# Patient Record
Sex: FEMALE | Race: WHITE | NOT HISPANIC OR LATINO | Employment: UNEMPLOYED | URBAN - METROPOLITAN AREA
[De-identification: names, ages, dates, MRNs, and addresses within clinical notes are randomized per-mention and may not be internally consistent; named-entity substitution may affect disease eponyms.]

---

## 2023-02-08 ENCOUNTER — HOSPITAL ENCOUNTER (EMERGENCY)
Facility: HOSPITAL | Age: 33
Discharge: HOME | End: 2023-02-08
Attending: EMERGENCY MEDICINE

## 2023-02-08 VITALS
RESPIRATION RATE: 18 BRPM | WEIGHT: 220 LBS | HEART RATE: 87 BPM | HEIGHT: 64 IN | DIASTOLIC BLOOD PRESSURE: 89 MMHG | TEMPERATURE: 99.6 F | OXYGEN SATURATION: 96 % | BODY MASS INDEX: 37.56 KG/M2 | SYSTOLIC BLOOD PRESSURE: 159 MMHG

## 2023-02-08 DIAGNOSIS — U07.1 COVID-19: ICD-10-CM

## 2023-02-08 DIAGNOSIS — N39.0 UTI (URINARY TRACT INFECTION): ICD-10-CM

## 2023-02-08 DIAGNOSIS — R11.2 NAUSEA AND VOMITING: Primary | ICD-10-CM

## 2023-02-08 LAB
ALBUMIN SERPL BCP-MCNC: 4 G/DL (ref 3.5–5)
ALP SERPL-CCNC: 73 U/L (ref 46–116)
ALT SERPL W P-5'-P-CCNC: 38 U/L (ref 12–78)
ANION GAP SERPL CALCULATED.3IONS-SCNC: 8 MMOL/L (ref 4–13)
AST SERPL W P-5'-P-CCNC: 24 U/L (ref 5–45)
BACTERIA UR QL AUTO: ABNORMAL /HPF
BASOPHILS # BLD AUTO: 0.04 THOUSANDS/ÂΜL (ref 0–0.1)
BASOPHILS NFR BLD AUTO: 0 % (ref 0–1)
BILIRUB SERPL-MCNC: 0.4 MG/DL (ref 0.2–1)
BILIRUB UR QL STRIP: NEGATIVE
BUN SERPL-MCNC: 10 MG/DL (ref 5–25)
CALCIUM SERPL-MCNC: 9.6 MG/DL (ref 8.3–10.1)
CHLORIDE SERPL-SCNC: 99 MMOL/L (ref 96–108)
CLARITY UR: CLEAR
CO2 SERPL-SCNC: 28 MMOL/L (ref 21–32)
COLOR UR: YELLOW
CREAT SERPL-MCNC: 0.89 MG/DL (ref 0.6–1.3)
EOSINOPHIL # BLD AUTO: 0.02 THOUSAND/ÂΜL (ref 0–0.61)
EOSINOPHIL NFR BLD AUTO: 0 % (ref 0–6)
ERYTHROCYTE [DISTWIDTH] IN BLOOD BY AUTOMATED COUNT: 12.2 % (ref 11.6–15.1)
EXT PREGNANCY TEST URINE: NEGATIVE
EXT. CONTROL: NORMAL
GFR SERPL CREATININE-BSD FRML MDRD: 85 ML/MIN/1.73SQ M
GLUCOSE SERPL-MCNC: 148 MG/DL (ref 65–140)
GLUCOSE UR STRIP-MCNC: NEGATIVE MG/DL
HCT VFR BLD AUTO: 42.3 % (ref 34.8–46.1)
HGB BLD-MCNC: 13.8 G/DL (ref 11.5–15.4)
HGB UR QL STRIP.AUTO: ABNORMAL
IMM GRANULOCYTES # BLD AUTO: 0.03 THOUSAND/UL (ref 0–0.2)
IMM GRANULOCYTES NFR BLD AUTO: 0 % (ref 0–2)
KETONES UR STRIP-MCNC: NEGATIVE MG/DL
LEUKOCYTE ESTERASE UR QL STRIP: ABNORMAL
LIPASE SERPL-CCNC: 129 U/L (ref 73–393)
LYMPHOCYTES # BLD AUTO: 1.39 THOUSANDS/ÂΜL (ref 0.6–4.47)
LYMPHOCYTES NFR BLD AUTO: 14 % (ref 14–44)
MCH RBC QN AUTO: 27.8 PG (ref 26.8–34.3)
MCHC RBC AUTO-ENTMCNC: 32.6 G/DL (ref 31.4–37.4)
MCV RBC AUTO: 85 FL (ref 82–98)
MONOCYTES # BLD AUTO: 0.7 THOUSAND/ÂΜL (ref 0.17–1.22)
MONOCYTES NFR BLD AUTO: 7 % (ref 4–12)
MUCOUS THREADS UR QL AUTO: ABNORMAL
NEUTROPHILS # BLD AUTO: 7.8 THOUSANDS/ÂΜL (ref 1.85–7.62)
NEUTS SEG NFR BLD AUTO: 79 % (ref 43–75)
NITRITE UR QL STRIP: NEGATIVE
NON-SQ EPI CELLS URNS QL MICRO: ABNORMAL /HPF
NRBC BLD AUTO-RTO: 0 /100 WBCS
PH UR STRIP.AUTO: 6.5 [PH]
PLATELET # BLD AUTO: 337 THOUSANDS/UL (ref 149–390)
PMV BLD AUTO: 8.9 FL (ref 8.9–12.7)
POTASSIUM SERPL-SCNC: 4 MMOL/L (ref 3.5–5.3)
PROT SERPL-MCNC: 8.8 G/DL (ref 6.4–8.4)
PROT UR STRIP-MCNC: ABNORMAL MG/DL
RBC # BLD AUTO: 4.96 MILLION/UL (ref 3.81–5.12)
RBC #/AREA URNS AUTO: ABNORMAL /HPF
SODIUM SERPL-SCNC: 135 MMOL/L (ref 135–147)
SP GR UR STRIP.AUTO: 1.01 (ref 1–1.03)
UROBILINOGEN UR STRIP-ACNC: <2 MG/DL
WBC # BLD AUTO: 9.98 THOUSAND/UL (ref 4.31–10.16)
WBC #/AREA URNS AUTO: ABNORMAL /HPF

## 2023-02-08 RX ORDER — ONDANSETRON 4 MG/1
4 TABLET, ORALLY DISINTEGRATING ORAL EVERY 6 HOURS PRN
Qty: 20 TABLET | Refills: 0 | Status: SHIPPED | OUTPATIENT
Start: 2023-02-08

## 2023-02-08 RX ORDER — CEPHALEXIN 250 MG/1
500 CAPSULE ORAL ONCE
Status: COMPLETED | OUTPATIENT
Start: 2023-02-08 | End: 2023-02-08

## 2023-02-08 RX ORDER — ONDANSETRON 2 MG/ML
4 INJECTION INTRAMUSCULAR; INTRAVENOUS ONCE
Status: COMPLETED | OUTPATIENT
Start: 2023-02-08 | End: 2023-02-08

## 2023-02-08 RX ORDER — KETOROLAC TROMETHAMINE 30 MG/ML
15 INJECTION, SOLUTION INTRAMUSCULAR; INTRAVENOUS ONCE
Status: COMPLETED | OUTPATIENT
Start: 2023-02-08 | End: 2023-02-08

## 2023-02-08 RX ORDER — CEPHALEXIN 500 MG/1
500 CAPSULE ORAL 2 TIMES DAILY
Qty: 14 CAPSULE | Refills: 0 | Status: SHIPPED | OUTPATIENT
Start: 2023-02-08 | End: 2023-02-15

## 2023-02-08 RX ADMIN — KETOROLAC TROMETHAMINE 15 MG: 30 INJECTION, SOLUTION INTRAMUSCULAR at 01:15

## 2023-02-08 RX ADMIN — ONDANSETRON 4 MG: 2 INJECTION INTRAMUSCULAR; INTRAVENOUS at 01:15

## 2023-02-08 RX ADMIN — CEPHALEXIN 500 MG: 250 CAPSULE ORAL at 01:43

## 2023-02-08 RX ADMIN — SODIUM CHLORIDE 1000 ML: 0.9 INJECTION, SOLUTION INTRAVENOUS at 01:15

## 2023-02-08 NOTE — ED TRIAGE NOTES
Pt  Arrives to ED with complaints of fatigue, abd pain and nausea x4 days  Pt  State she was dx with covid 4 days ago  Last dose tylenol around 1700

## 2023-02-08 NOTE — ED PROVIDER NOTES
History  Chief Complaint   Patient presents with   • Abdominal Pain     35 yo F with PMH of tubal ligation, prior kidney stone, COVID 19 dx 4 days ago presents to ED with abd pain, nausea, and fatigue  Generalized abd pain  Nausea, vomiting  Tolerating po, but not much  Mild dysuria  No diarrhea  Myalgias, HA  Tylenol at home  Doesn't feel like prior kidney stones  Minimal cough  History provided by:  Patient and medical records   used: No    Abdominal Pain  Pain location:  Generalized  Pain quality: cramping    Pain radiates to:  Does not radiate  Pain severity:  Moderate  Onset quality:  Gradual  Progression:  Waxing and waning  Chronicity:  New  Context: recent illness and sick contacts    Context: not trauma    Worsened by:  Nothing  Ineffective treatments:  None tried  Associated symptoms: dysuria, fatigue and nausea    Associated symptoms: no chest pain, no chills, no constipation, no cough, no diarrhea, no fever, no hematuria, no shortness of breath, no sore throat and no vomiting        None       Past Medical History:   Diagnosis Date   • Kidney stone        Past Surgical History:   Procedure Laterality Date   • TUBAL LIGATION         History reviewed  No pertinent family history  I have reviewed and agree with the history as documented  E-Cigarette/Vaping     E-Cigarette/Vaping Substances          Review of Systems   Constitutional: Positive for fatigue  Negative for appetite change, chills and fever  HENT: Negative for congestion, ear pain, rhinorrhea, sore throat, trouble swallowing and voice change  Eyes: Negative for pain and visual disturbance  Respiratory: Negative for cough, chest tightness and shortness of breath  Cardiovascular: Negative for chest pain, palpitations and leg swelling  Gastrointestinal: Positive for abdominal pain and nausea  Negative for blood in stool, constipation, diarrhea and vomiting  Genitourinary: Positive for dysuria   Negative for difficulty urinating, flank pain, frequency and hematuria  Musculoskeletal: Negative for back pain, neck pain and neck stiffness  Skin: Negative for rash  Neurological: Negative for dizziness, syncope, speech difficulty, light-headedness and headaches  Psychiatric/Behavioral: Negative for confusion and suicidal ideas  Physical Exam  Physical Exam  Vitals and nursing note reviewed  Constitutional:       General: She is not in acute distress  Appearance: She is well-developed  She is obese  She is ill-appearing  She is not diaphoretic  HENT:      Head: Normocephalic and atraumatic  Right Ear: External ear normal       Left Ear: External ear normal       Nose: Nose normal    Eyes:      General: No scleral icterus  Right eye: No discharge  Left eye: No discharge  Conjunctiva/sclera: Conjunctivae normal       Pupils: Pupils are equal, round, and reactive to light  Neck:      Trachea: No tracheal deviation  Cardiovascular:      Rate and Rhythm: Normal rate and regular rhythm  Heart sounds: Normal heart sounds  No murmur heard  No friction rub  No gallop  Pulmonary:      Effort: Pulmonary effort is normal  No respiratory distress  Breath sounds: Normal breath sounds  No stridor  Chest:      Chest wall: No tenderness  Abdominal:      General: Bowel sounds are normal       Palpations: Abdomen is soft  Tenderness: There is no abdominal tenderness (non-tender on exam)  There is no guarding or rebound  Musculoskeletal:         General: No deformity  Normal range of motion  Cervical back: Normal range of motion and neck supple  Lymphadenopathy:      Cervical: No cervical adenopathy  Skin:     General: Skin is warm and dry  Findings: No rash  Neurological:      Mental Status: She is alert and oriented to person, place, and time  Cranial Nerves: No cranial nerve deficit  Sensory: No sensory deficit        Coordination: Coordination normal    Psychiatric:         Behavior: Behavior normal          Vital Signs  ED Triage Vitals   Temperature Pulse Respirations Blood Pressure SpO2   02/07/23 2130 02/07/23 2130 02/07/23 2130 02/07/23 2130 02/07/23 2130   99 6 °F (37 6 °C) 104 18 155/89 98 %      Temp Source Heart Rate Source Patient Position - Orthostatic VS BP Location FiO2 (%)   02/07/23 2130 02/08/23 0049 02/08/23 0049 -- --   Oral Monitor Lying        Pain Score       --                  Vitals:    02/07/23 2130 02/08/23 0049   BP: 155/89 159/89   Pulse: 104 87   Patient Position - Orthostatic VS:  Lying         Visual Acuity      ED Medications  Medications   sodium chloride 0 9 % bolus 1,000 mL (1,000 mL Intravenous New Bag 2/8/23 0115)   ondansetron (ZOFRAN) injection 4 mg (4 mg Intravenous Given 2/8/23 0115)   ketorolac (TORADOL) injection 15 mg (15 mg Intravenous Given 2/8/23 0115)   cephalexin (KEFLEX) capsule 500 mg (500 mg Oral Given 2/8/23 0143)       Diagnostic Studies  Results Reviewed     Procedure Component Value Units Date/Time    Urine Microscopic [[id number]]  (Abnormal) Collected: 02/08/23 0036    Lab Status: Final result Specimen: Urine, Clean Catch Updated: 02/08/23 0112     RBC, UA 10-20 /hpf      WBC, UA 4-10 /hpf      Epithelial Cells Occasional /hpf      Bacteria, UA Occasional /hpf      MUCUS THREADS None Seen    Comprehensive metabolic panel [[id number]]  (Abnormal) Collected: 02/08/23 0042    Lab Status: Final result Specimen: Blood from Arm, Left Updated: 02/08/23 0112     Sodium 135 mmol/L      Potassium 4 0 mmol/L      Chloride 99 mmol/L      CO2 28 mmol/L      ANION GAP 8 mmol/L      BUN 10 mg/dL      Creatinine 0 89 mg/dL      Glucose 148 mg/dL      Calcium 9 6 mg/dL      AST 24 U/L      ALT 38 U/L      Alkaline Phosphatase 73 U/L      Total Protein 8 8 g/dL      Albumin 4 0 g/dL      Total Bilirubin 0 40 mg/dL      eGFR 85 ml/min/1 73sq m     Narrative:      [place] guidelines for Chronic Kidney Disease (CKD):   •  Stage 1 with normal or high GFR (GFR > 90 mL/min/1 73 square meters)  •  Stage 2 Mild CKD (GFR = 60-89 mL/min/1 73 square meters)  •  Stage 3A Moderate CKD (GFR = 45-59 mL/min/1 73 square meters)  •  Stage 3B Moderate CKD (GFR = 30-44 mL/min/1 73 square meters)  •  Stage 4 Severe CKD (GFR = 15-29 mL/min/1 73 square meters)  •  Stage 5 End Stage CKD (GFR <15 mL/min/1 73 square meters)  Note: GFR calculation is accurate only with a steady state creatinine    Lipase [[id number]]  (Normal) Collected: 02/08/23 0042    Lab Status: Final result Specimen: Blood from Arm, Left Updated: 02/08/23 0112     Lipase 129 u/L     UA w Reflex to Microscopic w Reflex to Culture [[id number]]  (Abnormal) Collected: 02/08/23 0036    Lab Status: Final result Specimen: Urine, Clean Catch Updated: 02/08/23 0053     Color, UA Yellow     Clarity, UA Clear     Specific Gravity, UA 1 010     pH, UA 6 5     Leukocytes, UA Moderate     Nitrite, UA Negative     Protein, UA 30 (1+) mg/dl      Glucose, UA Negative mg/dl      Ketones, UA Negative mg/dl      Urobilinogen, UA <2 0 mg/dl      Bilirubin, UA Negative     Occult Blood, UA Large    CBC and differential [[id number]]  (Abnormal) Collected: 02/08/23 0042    Lab Status: Final result Specimen: Blood from Arm, Left Updated: 02/08/23 0048     WBC 9 98 Thousand/uL      RBC 4 96 Million/uL      Hemoglobin 13 8 g/dL      Hematocrit 42 3 %      MCV 85 fL      MCH 27 8 pg      MCHC 32 6 g/dL      RDW 12 2 %      MPV 8 9 fL      Platelets 912 Thousands/uL      nRBC 0 /100 WBCs      Neutrophils Relative 79 %      Immat GRANS % 0 %      Lymphocytes Relative 14 %      Monocytes Relative 7 %      Eosinophils Relative 0 %      Basophils Relative 0 %      Neutrophils Absolute 7 80 Thousands/µL      Immature Grans Absolute 0 03 Thousand/uL      Lymphocytes Absolute 1 39 Thousands/µL      Monocytes Absolute 0 70 Thousand/µL      Eosinophils Absolute 0 02 Thousand/µL      Basophils Absolute 0 04 Thousands/µL     POCT pregnancy, urine [[id number]]  (Normal) Resulted: 02/08/23 0046    Lab Status: Final result Specimen: Urine Updated: 02/08/23 0047     EXT Preg Test, Ur Negative     Control Valid                 No orders to display              Procedures  Procedures         ED Course  ED Course as of 02/08/23 0146   Wed Feb 08, 2023   0105 Pt nontender on exam  Will treat sx  Suspect 2/2 covid infection rather than acute surgical emergency  0145 Pt with uti  Tolerated abx  Will d/c home after fluids complete  Discussed supportive care, pcp f/u, and RTED instructions  Pt agrees with plan  SBIRT 20yo+    Flowsheet Row Most Recent Value   SBIRT (23 yo +)    In order to provide better care to our patients, we are screening all of our patients for alcohol and drug use  Would it be okay to ask you these screening questions? Yes Filed at: 02/08/2023 0047   Initial Alcohol Screen: US AUDIT-C     1  How often do you have a drink containing alcohol? 0 Filed at: 02/08/2023 0047   2  How many drinks containing alcohol do you have on a typical day you are drinking? 0 Filed at: 02/08/2023 0047   3a  Male UNDER 65: How often do you have five or more drinks on one occasion? 0 Filed at: 02/08/2023 0047   3b  FEMALE Any Age, or MALE 65+: How often do you have 4 or more drinks on one occassion? 0 Filed at: 02/08/2023 0047   Audit-C Score 0 Filed at: 02/08/2023 1290   ZENAIDA: How many times in the past year have you    Used an illegal drug or used a prescription medication for non-medical reasons?  Never Filed at: 02/08/2023 0047                    MDM    Disposition  Final diagnoses:   Nausea and vomiting   UTI (urinary tract infection)   COVID-19     Time reflects when diagnosis was documented in both MDM as applicable and the Disposition within this note     Time User Action Codes Description Comment    2/8/2023  1:14 AM Charly Prieto [R11 2] Nausea and vomiting     2/8/2023  1:14 AM Jesus Manuel PHILLIP Add [N39 0] UTI (urinary tract infection)     2/8/2023  1:14 AM Jason Arvizu Add [U07 1] COVID-19       ED Disposition     ED Disposition   Discharge    Condition   Stable    Date/Time   Wed Feb 8, 2023  1:46 AM    Neil Drake discharge to home/self care  Follow-up Information     Follow up With Specialties Details Why Contact Info Additional Information     [address] Emergency Department Emergency Medicine  If symptoms worsen [address] [place], [address]  [address] [facility], [address] [address]    [place]  Schedule an appointment as soon as possible for a visit  Call to establish a primary care provider  [phone]             Patient's Medications   Discharge Prescriptions    CEPHALEXIN (KEFLEX) 500 MG CAPSULE    Take 1 capsule (500 mg total) by mouth 2 (two) times a day for 7 days       Start Date: 2/8/2023  End Date: 2/15/2023       Order Dose: 500 mg       Quantity: 14 capsule    Refills: 0    ONDANSETRON (ZOFRAN-ODT) 4 MG DISINTEGRATING TABLET    Take 1 tablet (4 mg total) by mouth every 6 (six) hours as needed for nausea       Start Date: 2/8/2023  End Date: --       Order Dose: 4 mg       Quantity: 20 tablet    Refills: 0       No discharge procedures on file      PDMP Review     None          ED Provider  Electronically Signed by           Jen Beck MD  02/08/23 8952

## 2023-08-30 ENCOUNTER — HOSPITAL ENCOUNTER (EMERGENCY)
Facility: HOSPITAL | Age: 33
Discharge: HOME | End: 2023-08-30
Attending: EMERGENCY MEDICINE
Payer: OTHER GOVERNMENT

## 2023-08-30 ENCOUNTER — APPOINTMENT (EMERGENCY)
Dept: CT IMAGING | Facility: HOSPITAL | Age: 33
End: 2023-08-30
Payer: OTHER GOVERNMENT

## 2023-08-30 VITALS
TEMPERATURE: 98.3 F | SYSTOLIC BLOOD PRESSURE: 142 MMHG | RESPIRATION RATE: 15 BRPM | OXYGEN SATURATION: 95 % | HEART RATE: 92 BPM | DIASTOLIC BLOOD PRESSURE: 88 MMHG

## 2023-08-30 DIAGNOSIS — N20.1 RIGHT URETERAL STONE: Primary | ICD-10-CM

## 2023-08-30 LAB
ALBUMIN SERPL BCP-MCNC: 4.8 G/DL (ref 3.5–5)
ALP SERPL-CCNC: 61 U/L (ref 34–104)
ALT SERPL W P-5'-P-CCNC: 26 U/L (ref 7–52)
ANION GAP SERPL CALCULATED.3IONS-SCNC: 7 MMOL/L
AST SERPL W P-5'-P-CCNC: 22 U/L (ref 13–39)
BACTERIA UR QL AUTO: ABNORMAL /HPF
BASOPHILS # BLD AUTO: 0.06 THOUSANDS/ÂΜL (ref 0–0.1)
BASOPHILS NFR BLD AUTO: 1 % (ref 0–1)
BILIRUB SERPL-MCNC: 0.34 MG/DL (ref 0.2–1)
BILIRUB UR QL STRIP: NEGATIVE
BUN SERPL-MCNC: 11 MG/DL (ref 5–25)
CALCIUM SERPL-MCNC: 10 MG/DL (ref 8.4–10.2)
CHLORIDE SERPL-SCNC: 101 MMOL/L (ref 96–108)
CLARITY UR: CLEAR
CO2 SERPL-SCNC: 26 MMOL/L (ref 21–32)
COLOR UR: ABNORMAL
CREAT SERPL-MCNC: 0.83 MG/DL (ref 0.6–1.3)
EOSINOPHIL # BLD AUTO: 0.07 THOUSAND/ÂΜL (ref 0–0.61)
EOSINOPHIL NFR BLD AUTO: 1 % (ref 0–6)
ERYTHROCYTE [DISTWIDTH] IN BLOOD BY AUTOMATED COUNT: 12 % (ref 11.6–15.1)
EXT PREGNANCY TEST URINE: NEGATIVE
EXT. CONTROL: NORMAL
GFR SERPL CREATININE-BSD FRML MDRD: 92 ML/MIN/1.73SQ M
GLUCOSE SERPL-MCNC: 132 MG/DL (ref 65–140)
GLUCOSE UR STRIP-MCNC: NEGATIVE MG/DL
HCT VFR BLD AUTO: 45.2 % (ref 34.8–46.1)
HGB BLD-MCNC: 14.9 G/DL (ref 11.5–15.4)
HGB UR QL STRIP.AUTO: ABNORMAL
IMM GRANULOCYTES # BLD AUTO: 0.03 THOUSAND/UL (ref 0–0.2)
IMM GRANULOCYTES NFR BLD AUTO: 0 % (ref 0–2)
KETONES UR STRIP-MCNC: NEGATIVE MG/DL
LEUKOCYTE ESTERASE UR QL STRIP: NEGATIVE
LYMPHOCYTES # BLD AUTO: 1.83 THOUSANDS/ÂΜL (ref 0.6–4.47)
LYMPHOCYTES NFR BLD AUTO: 20 % (ref 14–44)
MCH RBC QN AUTO: 27.7 PG (ref 26.8–34.3)
MCHC RBC AUTO-ENTMCNC: 33 G/DL (ref 31.4–37.4)
MCV RBC AUTO: 84 FL (ref 82–98)
MONOCYTES # BLD AUTO: 0.3 THOUSAND/ÂΜL (ref 0.17–1.22)
MONOCYTES NFR BLD AUTO: 3 % (ref 4–12)
NEUTROPHILS # BLD AUTO: 6.77 THOUSANDS/ÂΜL (ref 1.85–7.62)
NEUTS SEG NFR BLD AUTO: 75 % (ref 43–75)
NITRITE UR QL STRIP: POSITIVE
NON-SQ EPI CELLS URNS QL MICRO: ABNORMAL /HPF
NRBC BLD AUTO-RTO: 0 /100 WBCS
PH UR STRIP.AUTO: 6.5 [PH]
PLATELET # BLD AUTO: 386 THOUSANDS/UL (ref 149–390)
PMV BLD AUTO: 9 FL (ref 8.9–12.7)
POTASSIUM SERPL-SCNC: 3.7 MMOL/L (ref 3.5–5.3)
PROT SERPL-MCNC: 8.5 G/DL (ref 6.4–8.4)
PROT UR STRIP-MCNC: NEGATIVE MG/DL
RBC # BLD AUTO: 5.37 MILLION/UL (ref 3.81–5.12)
RBC #/AREA URNS AUTO: ABNORMAL /HPF
SODIUM SERPL-SCNC: 134 MMOL/L (ref 135–147)
SP GR UR STRIP.AUTO: <1.005 (ref 1–1.03)
UROBILINOGEN UR STRIP-ACNC: <2 MG/DL
WBC # BLD AUTO: 9.06 THOUSAND/UL (ref 4.31–10.16)
WBC #/AREA URNS AUTO: ABNORMAL /HPF

## 2023-08-30 PROCEDURE — 80053 COMPREHEN METABOLIC PANEL: CPT | Performed by: PHYSICIAN ASSISTANT

## 2023-08-30 PROCEDURE — 81025 URINE PREGNANCY TEST: CPT | Performed by: EMERGENCY MEDICINE

## 2023-08-30 PROCEDURE — 36415 COLL VENOUS BLD VENIPUNCTURE: CPT | Performed by: PHYSICIAN ASSISTANT

## 2023-08-30 PROCEDURE — 74176 CT ABD & PELVIS W/O CONTRAST: CPT

## 2023-08-30 PROCEDURE — 96361 HYDRATE IV INFUSION ADD-ON: CPT

## 2023-08-30 PROCEDURE — 85025 COMPLETE CBC W/AUTO DIFF WBC: CPT | Performed by: PHYSICIAN ASSISTANT

## 2023-08-30 PROCEDURE — 96374 THER/PROPH/DIAG INJ IV PUSH: CPT

## 2023-08-30 PROCEDURE — 99284 EMERGENCY DEPT VISIT MOD MDM: CPT

## 2023-08-30 PROCEDURE — G1004 CDSM NDSC: HCPCS

## 2023-08-30 PROCEDURE — 99285 EMERGENCY DEPT VISIT HI MDM: CPT | Performed by: PHYSICIAN ASSISTANT

## 2023-08-30 PROCEDURE — 81001 URINALYSIS AUTO W/SCOPE: CPT | Performed by: EMERGENCY MEDICINE

## 2023-08-30 RX ORDER — CEPHALEXIN 500 MG/1
500 CAPSULE ORAL EVERY 6 HOURS SCHEDULED
Qty: 28 CAPSULE | Refills: 0
Start: 2023-08-30 | End: 2023-08-30 | Stop reason: SDUPTHER

## 2023-08-30 RX ORDER — TAMSULOSIN HYDROCHLORIDE 0.4 MG/1
0.4 CAPSULE ORAL
Qty: 7 CAPSULE | Refills: 0 | Status: SHIPPED | OUTPATIENT
Start: 2023-08-30

## 2023-08-30 RX ORDER — TAMSULOSIN HYDROCHLORIDE 0.4 MG/1
0.4 CAPSULE ORAL
Qty: 7 CAPSULE | Refills: 0
Start: 2023-08-30 | End: 2023-08-30 | Stop reason: SDUPTHER

## 2023-08-30 RX ORDER — KETOROLAC TROMETHAMINE 30 MG/ML
30 INJECTION, SOLUTION INTRAMUSCULAR; INTRAVENOUS ONCE
Status: COMPLETED | OUTPATIENT
Start: 2023-08-30 | End: 2023-08-30

## 2023-08-30 RX ORDER — PHENAZOPYRIDINE HYDROCHLORIDE 200 MG/1
200 TABLET, FILM COATED ORAL 3 TIMES DAILY
Qty: 6 TABLET | Refills: 0 | Status: SHIPPED | OUTPATIENT
Start: 2023-08-30

## 2023-08-30 RX ORDER — PHENAZOPYRIDINE HYDROCHLORIDE 200 MG/1
200 TABLET, FILM COATED ORAL 3 TIMES DAILY
Qty: 6 TABLET | Refills: 0
Start: 2023-08-30 | End: 2023-08-30 | Stop reason: SDUPTHER

## 2023-08-30 RX ORDER — CEPHALEXIN 500 MG/1
500 CAPSULE ORAL EVERY 6 HOURS SCHEDULED
Qty: 28 CAPSULE | Refills: 0 | Status: SHIPPED | OUTPATIENT
Start: 2023-08-30 | End: 2023-09-06

## 2023-08-30 RX ORDER — CEPHALEXIN 500 MG/1
500 CAPSULE ORAL EVERY 6 HOURS SCHEDULED
Qty: 28 CAPSULE | Refills: 0 | Status: CANCELLED | OUTPATIENT
Start: 2023-08-30 | End: 2023-09-06

## 2023-08-30 RX ORDER — METHADONE HYDROCHLORIDE 10 MG/ML
73 CONCENTRATE ORAL DAILY
COMMUNITY

## 2023-08-30 RX ADMIN — SODIUM CHLORIDE 1000 ML: 0.9 INJECTION, SOLUTION INTRAVENOUS at 10:43

## 2023-08-30 RX ADMIN — KETOROLAC TROMETHAMINE 30 MG: 30 INJECTION, SOLUTION INTRAMUSCULAR; INTRAVENOUS at 12:17

## 2023-08-30 NOTE — QUICK NOTE
Received TT about patient who presented to the ER after having flank pain a few weeks ago. She has since been having urinary burning without fevers or chills x5 days. She has been treated with Augmentin for a couple of days that she was prescribed through a [facility] service. CT renal study shows a 5 mm proximal right UVJ stone. She has mild to moderate hydroureteral nephrosis. Urine with 1-2 WBC and moderate epithelial cells. Positive nitrites negative leukocytes. WBC 6.12, creat 0.83. No right flank pain or abdominal pain currently. She does not appear toxic and her pain is controlled. Initially plan to discharge home with close follow-up however due to ongoing urinary symptoms can consider stent placement for likely UTI in setting of ureteral calculi. If patient is agreeable we will set her up for this, if she prefers to go home she should be aware that she could become very sick from her ureteral calculi. Strict ER precautions for any fevers, chills or severe pain if she opts for discharge.

## 2023-08-30 NOTE — ED PROVIDER NOTES
History  Chief Complaint   Patient presents with   • Possible UTI     Pt to er with complaints of burning and urinary frequency since Friday. Started abx yesterday but is still uncomfortable, also tried taking OTC AZO with no relief. Patient is a 74-year-old white female with history of kidney stone who reports 5-day history of burning with urination and pressure sensation when she urinates. She reports 3 weeks ago she had right flank pain lasting a day and a half. She has had intermittent twinges of pain to the right  lateral abdomen since. No fever or shaking chills. No hematuria. She was prescribed Augmentin by her [facility] service a couple days ago over the phone. No other complaints          Prior to Admission Medications   Prescriptions Last Dose Informant Patient Reported? Taking? methadone (DOLOPHINE) 10 mg/mL oral concentrated solution   Yes Yes   Sig: Take 73 mg by mouth daily   ondansetron (ZOFRAN-ODT) 4 mg disintegrating tablet Not Taking  No No   Sig: Take 1 tablet (4 mg total) by mouth every 6 (six) hours as needed for nausea   Patient not taking: Reported on 8/30/2023      Facility-Administered Medications: None       Past Medical History:   Diagnosis Date   • Kidney stone        Past Surgical History:   Procedure Laterality Date   • TUBAL LIGATION         History reviewed. No pertinent family history. I have reviewed and agree with the history as documented. E-Cigarette/Vaping     E-Cigarette/Vaping Substances     Social History     Tobacco Use   • Smoking status: Never   • Smokeless tobacco: Never   Substance Use Topics   • Alcohol use: Never   • Drug use: Never       Review of Systems   Constitutional: Negative for chills and fever. HENT: Negative for ear pain and sore throat. Respiratory: Negative for cough and shortness of breath. Cardiovascular: Negative for chest pain and palpitations. Gastrointestinal: Negative for abdominal pain and vomiting.    Genitourinary: Positive for flank pain. Negative for dysuria, frequency and hematuria. Musculoskeletal: Negative for arthralgias and back pain. Skin: Negative for color change and rash. Neurological: Negative for syncope. All other systems reviewed and are negative. Physical Exam  Physical Exam  Vitals and nursing note reviewed. Constitutional:       General: She is not in acute distress. Appearance: She is not ill-appearing, toxic-appearing or diaphoretic. HENT:      Head: Normocephalic and atraumatic. Right Ear: Tympanic membrane, ear canal and external ear normal.      Left Ear: Tympanic membrane, ear canal and external ear normal.      Nose: Nose normal.      Mouth/Throat:      Mouth: Mucous membranes are moist.      Pharynx: Oropharynx is clear. Eyes:      Extraocular Movements: Extraocular movements intact. Conjunctiva/sclera: Conjunctivae normal.      Pupils: Pupils are equal, round, and reactive to light. Cardiovascular:      Rate and Rhythm: Normal rate and regular rhythm. Pulses: Normal pulses. Heart sounds: Normal heart sounds. Pulmonary:      Effort: Pulmonary effort is normal.      Breath sounds: Normal breath sounds. Abdominal:      General: Abdomen is flat. Bowel sounds are normal. There is no distension. Palpations: Abdomen is soft. Tenderness: There is no abdominal tenderness. There is no right CVA tenderness, left CVA tenderness, guarding or rebound. Hernia: No hernia is present. Musculoskeletal:         General: Normal range of motion. Cervical back: Normal range of motion and neck supple. Skin:     General: Skin is warm and dry. Capillary Refill: Capillary refill takes less than 2 seconds. Neurological:      General: No focal deficit present. Mental Status: She is alert and oriented to person, place, and time. Mental status is at baseline.          Vital Signs  ED Triage Vitals   Temperature Pulse Respirations Blood Pressure SpO2   08/30/23 1016 08/30/23 1016 08/30/23 1016 08/30/23 1016 08/30/23 1016   98.3 °F (36.8 °C) 95 18 151/100 97 %      Temp Source Heart Rate Source Patient Position - Orthostatic VS BP Location FiO2 (%)   08/30/23 1016 08/30/23 1016 08/30/23 1016 08/30/23 1016 --   Oral Monitor Sitting Left arm       Pain Score       08/30/23 1217       4           Vitals:    08/30/23 1016 08/30/23 1045 08/30/23 1217   BP: 151/100 144/83 142/88   Pulse: 95 87 92   Patient Position - Orthostatic VS: Sitting           Visual Acuity      ED Medications  Medications   sodium chloride 0.9 % bolus 1,000 mL (0 mL Intravenous Stopped 8/30/23 1322)   ketorolac (TORADOL) injection 30 mg (30 mg Intravenous Given 8/30/23 1217)       Diagnostic Studies  Results Reviewed     Procedure Component Value Units Date/Time    Comprehensive metabolic panel [[id number]]  (Abnormal) Collected: 08/30/23 1044    Lab Status: Final result Specimen: Blood from Arm, Left Updated: 08/30/23 1109     Sodium 134 mmol/L      Potassium 3.7 mmol/L      Chloride 101 mmol/L      CO2 26 mmol/L      ANION GAP 7 mmol/L      BUN 11 mg/dL      Creatinine 0.83 mg/dL      Glucose 132 mg/dL      Calcium 10.0 mg/dL      AST 22 U/L      ALT 26 U/L      Alkaline Phosphatase 61 U/L      Total Protein 8.5 g/dL      Albumin 4.8 g/dL      Total Bilirubin 0.34 mg/dL      eGFR 92 ml/min/1.73sq m     Narrative:      [place] guidelines for Chronic Kidney Disease (CKD):   •  Stage 1 with normal or high GFR (GFR > 90 mL/min/1.73 square meters)  •  Stage 2 Mild CKD (GFR = 60-89 mL/min/1.73 square meters)  •  Stage 3A Moderate CKD (GFR = 45-59 mL/min/1.73 square meters)  •  Stage 3B Moderate CKD (GFR = 30-44 mL/min/1.73 square meters)  •  Stage 4 Severe CKD (GFR = 15-29 mL/min/1.73 square meters)  •  Stage 5 End Stage CKD (GFR <15 mL/min/1.73 square meters)  Note: GFR calculation is accurate only with a steady state creatinine    Urine Microscopic [[id number]] (Abnormal) Collected: 08/30/23 1025    Lab Status: Final result Specimen: Urine, Clean Catch Updated: 08/30/23 1057     RBC, UA 0-1 /hpf      WBC, UA 1-2 /hpf      Epithelial Cells Moderate /hpf      Bacteria, UA Occasional /hpf     UA w Reflex to Microscopic w Reflex to Culture [[id number]]  (Abnormal) Collected: 08/30/23 1025    Lab Status: Final result Specimen: Urine, Clean Catch Updated: 08/30/23 1057     Color, UA Dark Brown     Clarity, UA Clear     Specific Gravity, UA <1.005     pH, UA 6.5     Leukocytes, UA Negative     Nitrite, UA Positive     Protein, UA Negative mg/dl      Glucose, UA Negative mg/dl      Ketones, UA Negative mg/dl      Urobilinogen, UA <2.0 mg/dl      Bilirubin, UA Negative     Occult Blood, UA Trace    CBC and differential [[id number]]  (Abnormal) Collected: 08/30/23 1044    Lab Status: Final result Specimen: Blood from Arm, Left Updated: 08/30/23 1052     WBC 9.06 Thousand/uL      RBC 5.37 Million/uL      Hemoglobin 14.9 g/dL      Hematocrit 45.2 %      MCV 84 fL      MCH 27.7 pg      MCHC 33.0 g/dL      RDW 12.0 %      MPV 9.0 fL      Platelets 593 Thousands/uL      nRBC 0 /100 WBCs      Neutrophils Relative 75 %      Immat GRANS % 0 %      Lymphocytes Relative 20 %      Monocytes Relative 3 %      Eosinophils Relative 1 %      Basophils Relative 1 %      Neutrophils Absolute 6.77 Thousands/µL      Immature Grans Absolute 0.03 Thousand/uL      Lymphocytes Absolute 1.83 Thousands/µL      Monocytes Absolute 0.30 Thousand/µL      Eosinophils Absolute 0.07 Thousand/µL      Basophils Absolute 0.06 Thousands/µL     POCT pregnancy, urine [[id number]]  (Normal) Resulted: 08/30/23 1026    Lab Status: Final result Updated: 08/30/23 1026     EXT Preg Test, Ur Negative     Control Valid                 CT renal stone study abdomen pelvis without contrast   Final Result by Huma Perez MD (08/30 1120)      Minimal to mild right-sided hydroureteronephrosis secondary to a 5 mm calculus in the proximal right ureterovesical junction. Bilateral nephrolithiasis. Mild hepatic steatosis. Uterine fibroid. Workstation performed: [id number]                    Procedures  Procedures         ED Course                               SBIRT 22yo+    Flowsheet Row Most Recent Value   Initial Alcohol Screen: US AUDIT-C     1. How often do you have a drink containing alcohol? 0 Filed at: 08/30/2023 1017   2. How many drinks containing alcohol do you have on a typical day you are drinking? 0 Filed at: 08/30/2023 1017   3a. Male UNDER 65: How often do you have five or more drinks on one occasion? 0 Filed at: 08/30/2023 1017   3b. FEMALE Any Age, or MALE 65+: How often do you have 4 or more drinks on one occassion? 0 Filed at: 08/30/2023 1017   Audit-C Score 0 Filed at: 08/30/2023 1017   ZENAIDA: How many times in the past year have you. .. Used an illegal drug or used a prescription medication for non-medical reasons? Never Filed at: 08/30/2023 1017                    Medical Decision Making  Labs reviewed. Patient with slight hyponatremia with sodium of 134. IV normal saline fluid hydration given  Differential diagnosis includes kidney stone, UTI, pyelonephritis. Urinalysis shows moderate epithelial cells but 0-1 RBC and 1-2 WBC. .  Patient initially declined analgesia but then wanted IV Toradol. CT renal stone search is consistent with a 5 mm proximal right UVJ stone with mild hydroureteral nephrosis. [place] texted urology midlevel provider Wilber Chavarria. She advised they can stent her if patient agrees. Pt is non toxic and well appearing. She would like to be discharged home to try to pass the stone on her own. Urology midlevel with pass along her information to have office contact her for follow-up. Prescription for Flomax and Pyridium given. We will switch from Augmentin to Keflex at patient's request as she tolerates Keflex better she states.   Return precautions given including fever or intractable pain. Patient was advised to strain all urine. Amount and/or Complexity of Data Reviewed  Labs: ordered. Radiology: ordered. Risk  Prescription drug management. Disposition  Final diagnoses:   Right ureteral stone     Time reflects when diagnosis was documented in both MDM as applicable and the Disposition within this note     Time User Action Codes Description Comment    8/30/2023  1:09 PM Margareth Luis Add [N20.1] Right ureteral stone       ED Disposition     ED Disposition   Discharge    Condition   Stable    Date/Time   Wed Aug 30, 2023  1:09 PM    Comment   Pete Jeb discharge to home/self care. Follow-up Information     Follow up With Specialties Details Why [id number] [facility] [address] [facility], Nurse Practitioner   [address] [place] [address]  [phone]            Discharge Medication List as of 8/30/2023  1:13 PM      START taking these medications    Details   cephalexin (KEFLEX) 500 mg capsule Take 1 capsule (500 mg total) by mouth every 6 (six) hours for 7 days, Starting Wed 8/30/2023, Until Wed 9/6/2023, No Print      phenazopyridine (PYRIDIUM) 200 mg tablet Take 1 tablet (200 mg total) by mouth 3 (three) times a day, Starting Wed 8/30/2023, No Print      tamsulosin (FLOMAX) 0.4 mg Take 1 capsule (0.4 mg total) by mouth daily with dinner, Starting Wed 8/30/2023, No Print         CONTINUE these medications which have NOT CHANGED    Details   methadone (DOLOPHINE) 10 mg/mL oral concentrated solution Take 73 mg by mouth daily, Historical Med      ondansetron (ZOFRAN-ODT) 4 mg disintegrating tablet Take 1 tablet (4 mg total) by mouth every 6 (six) hours as needed for nausea, Starting Wed 2/8/2023, Print             No discharge procedures on file.     PDMP Review     None          ED Provider  Electronically Signed by           Fior Guy PA-C  08/30/23 2707

## 2023-08-30 NOTE — DISCHARGE INSTRUCTIONS
Ibuprofen (with food)/ tylenol may be taken for pain     Pyridium for urinary burning    Strain all urine    Drink 2 liters fluids daily    Stop augmentin; take keflex     Follow up with urology group as advised    Return to ED for fever, chills, intractable pain.

## 2023-10-07 ENCOUNTER — HOSPITAL ENCOUNTER (EMERGENCY)
Facility: HOSPITAL | Age: 33
Discharge: HOME | End: 2023-10-07
Attending: EMERGENCY MEDICINE
Payer: OTHER GOVERNMENT

## 2023-10-07 ENCOUNTER — APPOINTMENT (EMERGENCY)
Dept: CT IMAGING | Facility: HOSPITAL | Age: 33
End: 2023-10-07
Payer: OTHER GOVERNMENT

## 2023-10-07 VITALS
DIASTOLIC BLOOD PRESSURE: 84 MMHG | WEIGHT: 220 LBS | SYSTOLIC BLOOD PRESSURE: 172 MMHG | RESPIRATION RATE: 18 BRPM | HEART RATE: 98 BPM | TEMPERATURE: 98.7 F | OXYGEN SATURATION: 98 % | HEIGHT: 64 IN | BODY MASS INDEX: 37.56 KG/M2

## 2023-10-07 VITALS
WEIGHT: 220 LBS | OXYGEN SATURATION: 98 % | BODY MASS INDEX: 37.56 KG/M2 | RESPIRATION RATE: 18 BRPM | TEMPERATURE: 98.2 F | SYSTOLIC BLOOD PRESSURE: 167 MMHG | HEART RATE: 76 BPM | HEIGHT: 64 IN | DIASTOLIC BLOOD PRESSURE: 96 MMHG

## 2023-10-07 DIAGNOSIS — D25.9 UTERINE FIBROID: ICD-10-CM

## 2023-10-07 DIAGNOSIS — R10.9 RIGHT-SIDED ABDOMINAL PAIN OF UNKNOWN ETIOLOGY: Primary | ICD-10-CM

## 2023-10-07 DIAGNOSIS — R10.9 ABDOMINAL PAIN: Primary | ICD-10-CM

## 2023-10-07 DIAGNOSIS — N39.0 UTI (URINARY TRACT INFECTION): ICD-10-CM

## 2023-10-07 LAB
ALBUMIN SERPL BCP-MCNC: 4.2 G/DL (ref 3.5–5)
ALBUMIN SERPL BCP-MCNC: 4.5 G/DL (ref 3.5–5)
ALP SERPL-CCNC: 50 U/L (ref 34–104)
ALP SERPL-CCNC: 57 U/L (ref 34–104)
ALT SERPL W P-5'-P-CCNC: 21 U/L (ref 7–52)
ALT SERPL W P-5'-P-CCNC: 23 U/L (ref 7–52)
ANION GAP SERPL CALCULATED.3IONS-SCNC: 6 MMOL/L
ANION GAP SERPL CALCULATED.3IONS-SCNC: 8 MMOL/L
AST SERPL W P-5'-P-CCNC: 17 U/L (ref 13–39)
AST SERPL W P-5'-P-CCNC: 19 U/L (ref 13–39)
BACTERIA UR QL AUTO: ABNORMAL /HPF
BASOPHILS # BLD AUTO: 0.04 THOUSANDS/ÂΜL (ref 0–0.1)
BASOPHILS # BLD AUTO: 0.04 THOUSANDS/ÂΜL (ref 0–0.1)
BASOPHILS NFR BLD AUTO: 0 % (ref 0–1)
BASOPHILS NFR BLD AUTO: 1 % (ref 0–1)
BILIRUB SERPL-MCNC: 0.27 MG/DL (ref 0.2–1)
BILIRUB SERPL-MCNC: 0.41 MG/DL (ref 0.2–1)
BILIRUB UR QL STRIP: NEGATIVE
BUN SERPL-MCNC: 12 MG/DL (ref 5–25)
BUN SERPL-MCNC: 12 MG/DL (ref 5–25)
CALCIUM SERPL-MCNC: 9.6 MG/DL (ref 8.4–10.2)
CALCIUM SERPL-MCNC: 9.9 MG/DL (ref 8.4–10.2)
CHLORIDE SERPL-SCNC: 100 MMOL/L (ref 96–108)
CHLORIDE SERPL-SCNC: 102 MMOL/L (ref 96–108)
CLARITY UR: ABNORMAL
CO2 SERPL-SCNC: 27 MMOL/L (ref 21–32)
CO2 SERPL-SCNC: 29 MMOL/L (ref 21–32)
COLOR UR: ABNORMAL
CREAT SERPL-MCNC: 0.91 MG/DL (ref 0.6–1.3)
CREAT SERPL-MCNC: 0.93 MG/DL (ref 0.6–1.3)
EOSINOPHIL # BLD AUTO: 0.14 THOUSAND/ÂΜL (ref 0–0.61)
EOSINOPHIL # BLD AUTO: 0.18 THOUSAND/ÂΜL (ref 0–0.61)
EOSINOPHIL NFR BLD AUTO: 1 % (ref 0–6)
EOSINOPHIL NFR BLD AUTO: 2 % (ref 0–6)
ERYTHROCYTE [DISTWIDTH] IN BLOOD BY AUTOMATED COUNT: 12.1 % (ref 11.6–15.1)
ERYTHROCYTE [DISTWIDTH] IN BLOOD BY AUTOMATED COUNT: 12.1 % (ref 11.6–15.1)
EXT PREGNANCY TEST URINE: NEGATIVE
EXT. CONTROL: NORMAL
GFR SERPL CREATININE-BSD FRML MDRD: 80 ML/MIN/1.73SQ M
GFR SERPL CREATININE-BSD FRML MDRD: 82 ML/MIN/1.73SQ M
GLUCOSE SERPL-MCNC: 113 MG/DL (ref 65–140)
GLUCOSE SERPL-MCNC: 139 MG/DL (ref 65–140)
GLUCOSE UR STRIP-MCNC: NEGATIVE MG/DL
HCT VFR BLD AUTO: 39 % (ref 34.8–46.1)
HCT VFR BLD AUTO: 44.6 % (ref 34.8–46.1)
HGB BLD-MCNC: 12.7 G/DL (ref 11.5–15.4)
HGB BLD-MCNC: 14.2 G/DL (ref 11.5–15.4)
HGB UR QL STRIP.AUTO: ABNORMAL
IMM GRANULOCYTES # BLD AUTO: 0.03 THOUSAND/UL (ref 0–0.2)
IMM GRANULOCYTES # BLD AUTO: 0.04 THOUSAND/UL (ref 0–0.2)
IMM GRANULOCYTES NFR BLD AUTO: 0 % (ref 0–2)
IMM GRANULOCYTES NFR BLD AUTO: 0 % (ref 0–2)
KETONES UR STRIP-MCNC: NEGATIVE MG/DL
LEUKOCYTE ESTERASE UR QL STRIP: ABNORMAL
LIPASE SERPL-CCNC: 45 U/L (ref 11–82)
LIPASE SERPL-CCNC: 60 U/L (ref 11–82)
LYMPHOCYTES # BLD AUTO: 2.67 THOUSANDS/ÂΜL (ref 0.6–4.47)
LYMPHOCYTES # BLD AUTO: 2.82 THOUSANDS/ÂΜL (ref 0.6–4.47)
LYMPHOCYTES NFR BLD AUTO: 29 % (ref 14–44)
LYMPHOCYTES NFR BLD AUTO: 34 % (ref 14–44)
MCH RBC QN AUTO: 26.9 PG (ref 26.8–34.3)
MCH RBC QN AUTO: 27.5 PG (ref 26.8–34.3)
MCHC RBC AUTO-ENTMCNC: 31.8 G/DL (ref 31.4–37.4)
MCHC RBC AUTO-ENTMCNC: 32.6 G/DL (ref 31.4–37.4)
MCV RBC AUTO: 84 FL (ref 82–98)
MCV RBC AUTO: 85 FL (ref 82–98)
MONOCYTES # BLD AUTO: 0.37 THOUSAND/ÂΜL (ref 0.17–1.22)
MONOCYTES # BLD AUTO: 0.47 THOUSAND/ÂΜL (ref 0.17–1.22)
MONOCYTES NFR BLD AUTO: 5 % (ref 4–12)
MONOCYTES NFR BLD AUTO: 5 % (ref 4–12)
NEUTROPHILS # BLD AUTO: 4.51 THOUSANDS/ÂΜL (ref 1.85–7.62)
NEUTROPHILS # BLD AUTO: 6.19 THOUSANDS/ÂΜL (ref 1.85–7.62)
NEUTS SEG NFR BLD AUTO: 58 % (ref 43–75)
NEUTS SEG NFR BLD AUTO: 65 % (ref 43–75)
NITRITE UR QL STRIP: NEGATIVE
NON-SQ EPI CELLS URNS QL MICRO: ABNORMAL /HPF
NRBC BLD AUTO-RTO: 0 /100 WBCS
NRBC BLD AUTO-RTO: 0 /100 WBCS
PH UR STRIP.AUTO: 6.5 [PH]
PLATELET # BLD AUTO: 329 THOUSANDS/UL (ref 149–390)
PLATELET # BLD AUTO: 361 THOUSANDS/UL (ref 149–390)
PMV BLD AUTO: 9.2 FL (ref 8.9–12.7)
PMV BLD AUTO: 9.3 FL (ref 8.9–12.7)
POTASSIUM SERPL-SCNC: 3.7 MMOL/L (ref 3.5–5.3)
POTASSIUM SERPL-SCNC: 3.8 MMOL/L (ref 3.5–5.3)
PROT SERPL-MCNC: 7.7 G/DL (ref 6.4–8.4)
PROT SERPL-MCNC: 8.1 G/DL (ref 6.4–8.4)
PROT UR STRIP-MCNC: NEGATIVE MG/DL
RBC # BLD AUTO: 4.62 MILLION/UL (ref 3.81–5.12)
RBC # BLD AUTO: 5.27 MILLION/UL (ref 3.81–5.12)
RBC #/AREA URNS AUTO: ABNORMAL /HPF
SODIUM SERPL-SCNC: 135 MMOL/L (ref 135–147)
SODIUM SERPL-SCNC: 137 MMOL/L (ref 135–147)
SP GR UR STRIP.AUTO: <1.005 (ref 1–1.03)
UROBILINOGEN UR STRIP-ACNC: <2 MG/DL
WBC # BLD AUTO: 7.8 THOUSAND/UL (ref 4.31–10.16)
WBC # BLD AUTO: 9.7 THOUSAND/UL (ref 4.31–10.16)
WBC #/AREA URNS AUTO: ABNORMAL /HPF

## 2023-10-07 PROCEDURE — 74177 CT ABD & PELVIS W/CONTRAST: CPT

## 2023-10-07 PROCEDURE — 96361 HYDRATE IV INFUSION ADD-ON: CPT

## 2023-10-07 PROCEDURE — 81025 URINE PREGNANCY TEST: CPT | Performed by: EMERGENCY MEDICINE

## 2023-10-07 PROCEDURE — 80053 COMPREHEN METABOLIC PANEL: CPT | Performed by: EMERGENCY MEDICINE

## 2023-10-07 PROCEDURE — 99284 EMERGENCY DEPT VISIT MOD MDM: CPT

## 2023-10-07 PROCEDURE — 87077 CULTURE AEROBIC IDENTIFY: CPT | Performed by: EMERGENCY MEDICINE

## 2023-10-07 PROCEDURE — 85025 COMPLETE CBC W/AUTO DIFF WBC: CPT | Performed by: EMERGENCY MEDICINE

## 2023-10-07 PROCEDURE — 87086 URINE CULTURE/COLONY COUNT: CPT | Performed by: EMERGENCY MEDICINE

## 2023-10-07 PROCEDURE — 96374 THER/PROPH/DIAG INJ IV PUSH: CPT

## 2023-10-07 PROCEDURE — 83690 ASSAY OF LIPASE: CPT | Performed by: EMERGENCY MEDICINE

## 2023-10-07 PROCEDURE — 36415 COLL VENOUS BLD VENIPUNCTURE: CPT | Performed by: EMERGENCY MEDICINE

## 2023-10-07 PROCEDURE — 87186 SC STD MICRODIL/AGAR DIL: CPT | Performed by: EMERGENCY MEDICINE

## 2023-10-07 PROCEDURE — 99284 EMERGENCY DEPT VISIT MOD MDM: CPT | Performed by: EMERGENCY MEDICINE

## 2023-10-07 PROCEDURE — G1004 CDSM NDSC: HCPCS

## 2023-10-07 PROCEDURE — 81001 URINALYSIS AUTO W/SCOPE: CPT | Performed by: EMERGENCY MEDICINE

## 2023-10-07 PROCEDURE — 99285 EMERGENCY DEPT VISIT HI MDM: CPT | Performed by: EMERGENCY MEDICINE

## 2023-10-07 PROCEDURE — 96375 TX/PRO/DX INJ NEW DRUG ADDON: CPT

## 2023-10-07 RX ORDER — ONDANSETRON 2 MG/ML
4 INJECTION INTRAMUSCULAR; INTRAVENOUS ONCE
Status: COMPLETED | OUTPATIENT
Start: 2023-10-07 | End: 2023-10-07

## 2023-10-07 RX ORDER — KETOROLAC TROMETHAMINE 30 MG/ML
15 INJECTION, SOLUTION INTRAMUSCULAR; INTRAVENOUS ONCE
Status: COMPLETED | OUTPATIENT
Start: 2023-10-07 | End: 2023-10-07

## 2023-10-07 RX ADMIN — KETOROLAC TROMETHAMINE 15 MG: 30 INJECTION, SOLUTION INTRAMUSCULAR; INTRAVENOUS at 07:26

## 2023-10-07 RX ADMIN — ONDANSETRON 4 MG: 2 INJECTION INTRAMUSCULAR; INTRAVENOUS at 07:26

## 2023-10-07 RX ADMIN — SODIUM CHLORIDE 1000 ML: 0.9 INJECTION, SOLUTION INTRAVENOUS at 07:22

## 2023-10-07 RX ADMIN — IOHEXOL 100 ML: 350 INJECTION, SOLUTION INTRAVENOUS at 08:00

## 2023-10-07 NOTE — ED PROVIDER NOTES
History  Chief Complaint   Patient presents with    Flank Pain     Patient presents to the ED with c/o right flank pain x1 week, states pain has moved to RUQ. States minor urinary symptoms when pain began but none now, hx of kidney stones      Patient is a 78-year-old female who presents with right flank and abdominal pain. Patient reports that over the last 1 week she has had right flank pain initially that has moved to the right upper quadrant, constant, progressively worsening, sharp and crampy in nature, associated with nausea, constipation. Patient states that when the pain first started, she had some burning with urination, but that has since resolved. States that she has a past medical history significant for kidney stones, but this does not feel like prior episodes of kidney stones. She states that anytime that she tries to eat, her symptoms worsen. When she drinks fluids, there is no change in the symptoms. Prior to Admission Medications   Prescriptions Last Dose Informant Patient Reported? Taking? methadone (DOLOPHINE) 10 mg/mL oral concentrated solution   Yes No   Sig: Take 73 mg by mouth daily   ondansetron (ZOFRAN-ODT) 4 mg disintegrating tablet   No No   Sig: Take 1 tablet (4 mg total) by mouth every 6 (six) hours as needed for nausea   Patient not taking: Reported on 8/30/2023   phenazopyridine (PYRIDIUM) 200 mg tablet Not Taking  No No   Sig: Take 1 tablet (200 mg total) by mouth 3 (three) times a day   Patient not taking: Reported on 10/7/2023   tamsulosin (FLOMAX) 0.4 mg Not Taking  No No   Sig: Take 1 capsule (0.4 mg total) by mouth daily with dinner   Patient not taking: Reported on 10/7/2023      Facility-Administered Medications: None       Past Medical History:   Diagnosis Date    Kidney stone        Past Surgical History:   Procedure Laterality Date    TUBAL LIGATION         History reviewed. No pertinent family history.   I have reviewed and agree with the history as documented. E-Cigarette/Vaping     E-Cigarette/Vaping Substances     Social History     Tobacco Use    Smoking status: Never    Smokeless tobacco: Never   Substance Use Topics    Alcohol use: Never    Drug use: Never       Review of Systems   Constitutional:  Negative for chills and fever. Respiratory:  Negative for shortness of breath. Cardiovascular:  Negative for chest pain. Gastrointestinal:  Positive for abdominal pain, constipation and nausea. Negative for abdominal distention, diarrhea and vomiting. Genitourinary:  Positive for flank pain. Negative for dysuria, frequency, hematuria, urgency, vaginal bleeding, vaginal discharge and vaginal pain. Musculoskeletal:  Negative for back pain. Neurological:  Negative for weakness and numbness. Physical Exam  Physical Exam  Vitals and nursing note reviewed. Constitutional:       General: She is not in acute distress. Appearance: Normal appearance. She is not ill-appearing, toxic-appearing or diaphoretic. HENT:      Head: Normocephalic and atraumatic. Mouth/Throat:      Mouth: Mucous membranes are moist.   Eyes:      Conjunctiva/sclera: Conjunctivae normal.      Pupils: Pupils are equal, round, and reactive to light. Cardiovascular:      Rate and Rhythm: Normal rate and regular rhythm. Pulses: Normal pulses. Heart sounds: Normal heart sounds. No murmur heard. Pulmonary:      Effort: Pulmonary effort is normal. No respiratory distress. Breath sounds: Normal breath sounds. No stridor. No wheezing, rhonchi or rales. Chest:      Chest wall: No tenderness. Abdominal:      General: Bowel sounds are normal. There is no distension. Palpations: Abdomen is soft. Tenderness: There is abdominal tenderness in the right upper quadrant, right lower quadrant and epigastric area. There is no right CVA tenderness, left CVA tenderness, guarding or rebound.  Negative signs include Colon's sign, Rovsing's sign, McBurney's sign and psoas sign. Musculoskeletal:      Cervical back: Neck supple. Right lower leg: No edema. Left lower leg: No edema. Skin:     General: Skin is warm and dry. Neurological:      General: No focal deficit present. Mental Status: She is alert and oriented to person, place, and time. Mental status is at baseline.    Psychiatric:         Mood and Affect: Mood normal.         Behavior: Behavior normal.         Vital Signs  ED Triage Vitals   Temperature Pulse Respirations Blood Pressure SpO2   10/07/23 0701 10/07/23 0701 10/07/23 0701 10/07/23 0701 10/07/23 0701   98.7 °F (37.1 °C) 98 18 (!) 172/84 98 %      Temp Source Heart Rate Source Patient Position - Orthostatic VS BP Location FiO2 (%)   10/07/23 0701 10/07/23 0701 10/07/23 0701 10/07/23 0701 --   Temporal Monitor Sitting Right arm       Pain Score       10/07/23 0659       6           Vitals:    10/07/23 0701   BP: (!) 172/84   Pulse: 98   Patient Position - Orthostatic VS: Sitting         Visual Acuity      ED Medications  Medications   sodium chloride 0.9 % bolus 1,000 mL (0 mL Intravenous Stopped 10/7/23 0939)   ketorolac (TORADOL) injection 15 mg (15 mg Intravenous Given 10/7/23 0726)   ondansetron (ZOFRAN) injection 4 mg (4 mg Intravenous Given 10/7/23 0726)   iohexol (OMNIPAQUE) 350 MG/ML injection (MULTI-DOSE) 100 mL (100 mL Intravenous Given 10/7/23 0800)       Diagnostic Studies  Results Reviewed       Procedure Component Value Units Date/Time    Urine culture [[id number]]     Lab Status: No result Specimen: Urine     Urine Microscopic [[id number]]  (Abnormal) Collected: 10/07/23 0727    Lab Status: Final result Specimen: Urine, Clean Catch Updated: 10/07/23 0750     RBC, UA 2-4 /hpf      WBC, UA 4-10 /hpf      Epithelial Cells Innumerable /hpf      Bacteria, UA Occasional /hpf     UA w Reflex to Microscopic w Reflex to Culture [[id number]]  (Abnormal) Collected: 10/07/23 0727    Lab Status: Final result Specimen: Urine, Clean Catch Updated: 10/07/23 0750     Color, UA Light Yellow     Clarity, UA Slightly Cloudy     Specific Gravity, UA <1.005     pH, UA 6.5     Leukocytes, UA Small     Nitrite, UA Negative     Protein, UA Negative mg/dl      Glucose, UA Negative mg/dl      Ketones, UA Negative mg/dl      Urobilinogen, UA <2.0 mg/dl      Bilirubin, UA Negative     Occult Blood, UA Large    Comprehensive metabolic panel [[id number]] Collected: 10/07/23 0716    Lab Status: Final result Specimen: Blood from Arm, Right Updated: 10/07/23 0737     Sodium 135 mmol/L      Potassium 3.8 mmol/L      Chloride 100 mmol/L      CO2 27 mmol/L      ANION GAP 8 mmol/L      BUN 12 mg/dL      Creatinine 0.91 mg/dL      Glucose 139 mg/dL      Calcium 9.9 mg/dL      AST 19 U/L      ALT 23 U/L      Alkaline Phosphatase 57 U/L      Total Protein 8.1 g/dL      Albumin 4.5 g/dL      Total Bilirubin 0.27 mg/dL      eGFR 82 ml/min/1.73sq m     Narrative:      [facility] guidelines for Chronic Kidney Disease (CKD):     Stage 1 with normal or high GFR (GFR > 90 mL/min/1.73 square meters)    Stage 2 Mild CKD (GFR = 60-89 mL/min/1.73 square meters)    Stage 3A Moderate CKD (GFR = 45-59 mL/min/1.73 square meters)    Stage 3B Moderate CKD (GFR = 30-44 mL/min/1.73 square meters)    Stage 4 Severe CKD (GFR = 15-29 mL/min/1.73 square meters)    Stage 5 End Stage CKD (GFR <15 mL/min/1.73 square meters)  Note: GFR calculation is accurate only with a steady state creatinine    Lipase [[id number]]  (Normal) Collected: 10/07/23 0716    Lab Status: Final result Specimen: Blood from Arm, Right Updated: 10/07/23 0737     Lipase 60 u/L     POCT pregnancy, urine [[id number]]  (Normal) Resulted: 10/07/23 0731    Lab Status: Final result Updated: 10/07/23 0731     EXT Preg Test, Ur Negative     Control Valid    CBC and differential [[id number]]  (Abnormal) Collected: 10/07/23 0716    Lab Status: Final result Specimen: Blood from Arm, Right Updated: 10/07/23 0721     WBC 7.80 Thousand/uL      RBC 5.27 Million/uL      Hemoglobin 14.2 g/dL      Hematocrit 44.6 %      MCV 85 fL      MCH 26.9 pg      MCHC 31.8 g/dL      RDW 12.1 %      MPV 9.3 fL      Platelets 070 Thousands/uL      nRBC 0 /100 WBCs      Neutrophils Relative 58 %      Immat GRANS % 0 %      Lymphocytes Relative 34 %      Monocytes Relative 5 %      Eosinophils Relative 2 %      Basophils Relative 1 %      Neutrophils Absolute 4.51 Thousands/µL      Immature Grans Absolute 0.03 Thousand/uL      Lymphocytes Absolute 2.67 Thousands/µL      Monocytes Absolute 0.37 Thousand/µL      Eosinophils Absolute 0.18 Thousand/µL      Basophils Absolute 0.04 Thousands/µL                    CT abdomen pelvis with contrast   Final Result by Cristina Teague DO (10/07 1089)      No nephrolithiasis or hydronephrosis. No acute intra-abdominal abnormality. Workstation performed: [id number]                    Procedures  Procedures         ED Course  ED Course as of 10/07/23 0942   Sat Oct 07, 2023   0909 Subserosal pedunculated fibroid extends from the right uterine fundus. 1229 Reviewed finding of the fibroid and to follow up with OB. Reviewed negative imaging for any acute abdominal pathology. As the patient is having pain, counseled that since the CT is negative, to monitor her symptoms and return for any worsening or new symptoms. I recommended follow-up with GI in the outpatient setting as well as her primary care doctor. I reviewed again strict return precautions which the patient verbalized understanding of. All questions answered. SBIRT 20yo+      Flowsheet Row Most Recent Value   Initial Alcohol Screen: US AUDIT-C     1. How often do you have a drink containing alcohol? 0 Filed at: 10/07/2023 0701   2. How many drinks containing alcohol do you have on a typical day you are drinking? 0 Filed at: 10/07/2023 0701   3a. Male UNDER 65:  How often do you have five or more drinks on one occasion? 0 Filed at: 10/07/2023 0701   3b. FEMALE Any Age, or MALE 65+: How often do you have 4 or more drinks on one occassion? 0 Filed at: 10/07/2023 0701   Audit-C Score 0 Filed at: 10/07/2023 0701   ZENAIDA: How many times in the past year have you. .. Used an illegal drug or used a prescription medication for non-medical reasons? Never Filed at: 10/07/2023 0701                      Medical Decision Making  Assessment and plan:  Differential includes cholecystitis/biliary colic versus appendicitis versus gastritis versus pancreatitis versus colitis versus kidney stone. Check labs to evaluate for leukocytosis, anemia, electrolyte abnormalities, kidney liver function; urinalysis to rule out pregnancy and check for urinary tract infection; CT scan abdomen/pelvis to assess for aforementioned differential.  Treat symptomatically. Reassess. Amount and/or Complexity of Data Reviewed  Labs: ordered. Radiology: ordered. Risk  Prescription drug management. Disposition  Final diagnoses:   Abdominal pain   Uterine fibroid     Time reflects when diagnosis was documented in both MDM as applicable and the Disposition within this note       Time User Action Codes Description Comment    10/7/2023  9:16 AM Arvid Dew Add [R10.9] Abdominal pain     10/7/2023  9:33 AM Arvid Dew Add [D25.9] Uterine fibroid           ED Disposition       ED Disposition   Discharge    Condition   Stable    Date/Time   Sat Oct 7, 2023 [id number] discharge to home/self care.                    Follow-up Information       Follow up With Specialties Details Why Contact Info Additional 55 [facility] Emergency Department Emergency Medicine Go to  As needed, If symptoms worsen, for re-evaluation [address] [facility] [phone]  [address] [facility] Emergency Department, [address] [facility] [place], [address] [facility] Gastroenterology Schedule an appointment as soon as possible for a visit in 1 week for re-evaluation [address] Juan [address]zman [address] [address]  [address] [place] [address] [place] [facility] [address] [facility]     [phone]    [facility] Obstetrics and Gynecology Schedule an appointment as soon as possible for a visit in 1 week for re-evaluation [address] [facility] [address] [address]  [phone] [facility] OB/GYN [facility], [address] [place] [address], [address], [phone]            Patient's Medications   Discharge Prescriptions    No medications on file           PDMP Review       None            ED Provider  Electronically Signed by             Layton Tena DO  10/07/23 9764

## 2023-10-07 NOTE — ED PROVIDER NOTES
History  Chief Complaint   Patient presents with    Abdominal Pain     Patient reports to ED for second time today c/o worsening RUQ abdominal pain, "numb spot on my bellybutton" and a rash to right flank. All of these symptoms prompted patient to return to ER. 59-year-old female presents evaluation of right upper quadrant abdominal pain. The patient states that since she was last seen in the emergency department she is having sharp pain in the right upper quadrant of her abdomen. She also is describing numbness in the right lateral mid abdomen. She states that this is a superficial numbness. She does not have numbness in the right upper quadrant nor the right lower quadrant. She has some radiating pain to her back. She states that it is itchy. Patient states the right-sided abdominal pain has been going on for 1 week. Abdominal Pain      Prior to Admission Medications   Prescriptions Last Dose Informant Patient Reported? Taking? methadone (DOLOPHINE) 10 mg/mL oral concentrated solution   Yes No   Sig: Take 73 mg by mouth daily   ondansetron (ZOFRAN-ODT) 4 mg disintegrating tablet   No No   Sig: Take 1 tablet (4 mg total) by mouth every 6 (six) hours as needed for nausea   Patient not taking: Reported on 8/30/2023   phenazopyridine (PYRIDIUM) 200 mg tablet   No No   Sig: Take 1 tablet (200 mg total) by mouth 3 (three) times a day   Patient not taking: Reported on 10/7/2023   tamsulosin (FLOMAX) 0.4 mg   No No   Sig: Take 1 capsule (0.4 mg total) by mouth daily with dinner   Patient not taking: Reported on 10/7/2023      Facility-Administered Medications: None       Past Medical History:   Diagnosis Date    Kidney stone        Past Surgical History:   Procedure Laterality Date    TUBAL LIGATION         History reviewed. No pertinent family history. I have reviewed and agree with the history as documented.     E-Cigarette/Vaping     E-Cigarette/Vaping Substances     Social History     Tobacco Use Smoking status: Never    Smokeless tobacco: Never   Substance Use Topics    Alcohol use: Never    Drug use: Never       Review of Systems   Gastrointestinal:  Positive for abdominal pain. Physical Exam  Physical Exam  Vitals and nursing note reviewed. Constitutional:       General: She is not in acute distress. Appearance: She is well-developed. HENT:      Head: Normocephalic and atraumatic. Right Ear: External ear normal.      Left Ear: External ear normal.   Eyes:      General: No scleral icterus. Pulmonary:      Effort: Pulmonary effort is normal. No respiratory distress. Abdominal:      General: There is no distension. Tenderness: There is abdominal tenderness in the right upper quadrant and right lower quadrant. Musculoskeletal:         General: Normal range of motion. Cervical back: Normal range of motion. Skin:     General: Skin is warm and dry. Findings: No rash. Neurological:      Mental Status: She is alert. Mental status is at baseline. Sensory: Sensory deficit (Patient reporting subjective numbness in the right lateral section of her abdomen.) present.    Psychiatric:         Mood and Affect: Mood normal.         Vital Signs  ED Triage Vitals [10/07/23 1612]   Temperature Pulse Respirations Blood Pressure SpO2   98.2 °F (36.8 °C) 76 18 167/96 98 %      Temp Source Heart Rate Source Patient Position - Orthostatic VS BP Location FiO2 (%)   Temporal Monitor Sitting Left arm --      Pain Score       7           Vitals:    10/07/23 1612   BP: 167/96   Pulse: 76   Patient Position - Orthostatic VS: Sitting         Visual Acuity      ED Medications  Medications - No data to display    Diagnostic Studies  Results Reviewed       Procedure Component Value Units Date/Time    Comprehensive metabolic panel [[id number]] Collected: 10/07/23 1623    Lab Status: Final result Specimen: Blood from Arm, Left Updated: 10/07/23 1704     Sodium 137 mmol/L      Potassium 3.7 mmol/L      Chloride 102 mmol/L      CO2 29 mmol/L      ANION GAP 6 mmol/L      BUN 12 mg/dL      Creatinine 0.93 mg/dL      Glucose 113 mg/dL      Calcium 9.6 mg/dL      AST 17 U/L      ALT 21 U/L      Alkaline Phosphatase 50 U/L      Total Protein 7.7 g/dL      Albumin 4.2 g/dL      Total Bilirubin 0.41 mg/dL      eGFR 80 ml/min/1.73sq m     Narrative:      [employer] guidelines for Chronic Kidney Disease (CKD):     Stage 1 with normal or high GFR (GFR > 90 mL/min/1.73 square meters)    Stage 2 Mild CKD (GFR = 60-89 mL/min/1.73 square meters)    Stage 3A Moderate CKD (GFR = 45-59 mL/min/1.73 square meters)    Stage 3B Moderate CKD (GFR = 30-44 mL/min/1.73 square meters)    Stage 4 Severe CKD (GFR = 15-29 mL/min/1.73 square meters)    Stage 5 End Stage CKD (GFR <15 mL/min/1.73 square meters)  Note: GFR calculation is accurate only with a steady state creatinine    Lipase [[id number]]  (Normal) Collected: 10/07/23 1623    Lab Status: Final result Specimen: Blood from Arm, Left Updated: 10/07/23 1704     Lipase 45 u/L     CBC and differential [[id number]] Collected: 10/07/23 1623    Lab Status: Final result Specimen: Blood from Arm, Left Updated: 10/07/23 1629     WBC 9.70 Thousand/uL      RBC 4.62 Million/uL      Hemoglobin 12.7 g/dL      Hematocrit 39.0 %      MCV 84 fL      MCH 27.5 pg      MCHC 32.6 g/dL      RDW 12.1 %      MPV 9.2 fL      Platelets 468 Thousands/uL      nRBC 0 /100 WBCs      Neutrophils Relative 65 %      Immat GRANS % 0 %      Lymphocytes Relative 29 %      Monocytes Relative 5 %      Eosinophils Relative 1 %      Basophils Relative 0 %      Neutrophils Absolute 6.19 Thousands/µL      Immature Grans Absolute 0.04 Thousand/uL      Lymphocytes Absolute 2.82 Thousands/µL      Monocytes Absolute 0.47 Thousand/µL      Eosinophils Absolute 0.14 Thousand/µL      Basophils Absolute 0.04 Thousands/µL                    No orders to display              Procedures  Procedures ED Course                               SBIRT 20yo+      Flowsheet Row Most Recent Value   Initial Alcohol Screen: US AUDIT-C     1. How often do you have a drink containing alcohol? 0 Filed at: 10/07/2023 1614   2. How many drinks containing alcohol do you have on a typical day you are drinking? 0 Filed at: 10/07/2023 1614   3a. Male UNDER 65: How often do you have five or more drinks on one occasion? 0 Filed at: 10/07/2023 1614   3b. FEMALE Any Age, or MALE 65+: How often do you have 4 or more drinks on one occassion? 0 Filed at: 10/07/2023 1614   Audit-C Score 0 Filed at: 10/07/2023 1614   ZENAIDA: How many times in the past year have you. .. Used an illegal drug or used a prescription medication for non-medical reasons? Never Filed at: 10/07/2023 1614                      Medical Decision Making  Differential diagnosis: Biliary colic, cutaneous nerve impingement, early shingles    I reviewed and discussed the work-up from the emergency department visit earlier this morning noting the patient having an essentially unremarkable work-up with laboratories and CT. The patient is in agreement that a repeat CT would not be beneficial.  Discussed plan to repeat some basic blood work. Unremarkable blood work noted. I discussed the unremarkable CT from earlier. Discussed plan for discharge and outpatient primary care follow-up discussed potential for shingles however would await development of vesicular rash given 1 week duration of symptoms. The patient (and any family present) verbalized understanding of the discharge instructions and warnings that would necessitate return to the Emergency Department. All questions were answered prior to discharge. Amount and/or Complexity of Data Reviewed  External Data Reviewed: notes. Labs: ordered.         Disposition  Final diagnoses:   Right-sided abdominal pain of unknown etiology     Time reflects when diagnosis was documented in both MDM as applicable and the Disposition within this note       Time User Action Codes Description Comment    10/7/2023  5:15 PM Charley Crockett Add [R10.9] Right-sided abdominal pain of unknown etiology           ED Disposition       ED Disposition   Discharge    Condition   Stable    Date/Time   Sat Oct 7, 2023 [place] discharge to home/self care. Follow-up Information       Follow up With Specialties Details Why Contact Info Additional 55 [facility] Emergency Department Emergency Medicine Go to  If symptoms worsen [address] [facility] [phone]  [address] [facility] Emergency Department, [address] [address] [place], [address]    your family doctor  Schedule an appointment as soon as possible for a visit  For further evaluation      [address] [facility] Schedule an appointment as soon as possible for a visit  For further evaluation, if not improved [address] [address]  [address] [place] [address] [place] [place] [facility] [place] [address] [facility]  [address] [phone]            Discharge Medication List as of 10/7/2023  5:16 PM        CONTINUE these medications which have NOT CHANGED    Details   methadone (DOLOPHINE) 10 mg/mL oral concentrated solution Take 73 mg by mouth daily, Historical Med      ondansetron (ZOFRAN-ODT) 4 mg disintegrating tablet Take 1 tablet (4 mg total) by mouth every 6 (six) hours as needed for nausea, Starting Wed 2/8/2023, Print      phenazopyridine (PYRIDIUM) 200 mg tablet Take 1 tablet (200 mg total) by mouth 3 (three) times a day, Starting Wed 8/30/2023, Print      tamsulosin (FLOMAX) 0.4 mg Take 1 capsule (0.4 mg total) by mouth daily with dinner, Starting Wed 8/30/2023, Print             No discharge procedures on file.     PDMP Review None            ED Provider  Electronically Signed by             Nicole Prince DO  10/07/23 0017

## 2023-10-09 LAB
BACTERIA UR CULT: ABNORMAL
BACTERIA UR CULT: ABNORMAL

## 2023-10-09 RX ORDER — CEPHALEXIN 500 MG/1
500 CAPSULE ORAL EVERY 12 HOURS SCHEDULED
Qty: 14 CAPSULE | Refills: 0 | Status: SHIPPED | OUTPATIENT
Start: 2023-10-09 | End: 2023-10-16

## 2023-10-09 NOTE — RESULT ENCOUNTER NOTE
Patient returned call, notified of UTI. Keflex 500mg BID x 7 days sent to [facility] aid in [place]. Instructed patient to f/u with PCP for recheck.

## 2023-12-26 ENCOUNTER — OFFICE VISIT (OUTPATIENT)
Dept: URGENT CARE | Facility: CLINIC | Age: 33
End: 2023-12-26

## 2023-12-26 VITALS
OXYGEN SATURATION: 98 % | TEMPERATURE: 97.8 F | BODY MASS INDEX: 39.48 KG/M2 | HEART RATE: 111 BPM | WEIGHT: 230 LBS | DIASTOLIC BLOOD PRESSURE: 90 MMHG | RESPIRATION RATE: 16 BRPM | SYSTOLIC BLOOD PRESSURE: 175 MMHG

## 2023-12-26 DIAGNOSIS — J06.9 VIRAL UPPER RESPIRATORY ILLNESS: Primary | ICD-10-CM

## 2023-12-26 DIAGNOSIS — Z00.00 ENCOUNTER FOR MEDICAL EXAMINATION TO ESTABLISH CARE: ICD-10-CM

## 2023-12-26 PROCEDURE — G0382 LEV 3 HOSP TYPE B ED VISIT: HCPCS | Performed by: PHYSICIAN ASSISTANT

## 2023-12-26 RX ORDER — METHYLPREDNISOLONE 4 MG/1
TABLET ORAL
Qty: 1 EACH | Refills: 0 | Status: SHIPPED | OUTPATIENT
Start: 2023-12-26

## 2023-12-26 NOTE — PATIENT INSTRUCTIONS
Take steroids as directed  Discussed establishment of PCP - referral placed   Proceed to  ER if symptoms worsen.

## 2023-12-26 NOTE — PROGRESS NOTES
"  [facility]        NAME: Adelia Mascorro is a 33 y.o. female  : 1990    MRN: [id number]  DATE: 2023  TIME: 8:32 AM    Assessment and Plan   Viral upper respiratory illness [J06.9]  1. Viral upper respiratory illness  methylPREDNISolone 4 MG tablet therapy pack      2. Encounter for medical examination to establish care  Ambulatory Referral to Family Practice            Patient Instructions   Chest is clear on exam, no fever, good pulse ox.  Suspect viral etiology.  Start steroids.  Discussed elevated BP which has been high at previous visits as well and the need to establish a PCP to address this, referral placed.  If she were to develop severe headache, chest pain, weakness, visual changes she needs to go to ER immediately.   Take steroids as directed  Discussed establishment of PCP - referral placed   Proceed to  ER if symptoms worsen.    Chief Complaint     Chief Complaint   Patient presents with    Cough     Pt states a week ago, she had a \"lump in her throat\", then turned into hoarseness, chest tightness, and productive cough with green mucus. Covid negative at home.         History of Present Illness       HPI  34 y/o female presents for evaluation of throat pain and cough.  She states a week ago she started with a lump in her throat then developed laryngitis, cough and chest tightness/burning.  She states it feels like it is difficult to swallow but she is not actually choking or having difficulty swallowing or passing liquids or solids.  She denies sinus congestion/fever/chills/shortness of breath.  She states cough is productive of green mucous.    She has tried Mucinex and saline mist without relief.    Neg home COVID test  Review of Systems   Review of Systems   Constitutional:  Negative for chills and fever.   HENT:  Positive for sore throat and voice change. Negative for congestion, ear pain and trouble swallowing.    Eyes:  Negative for pain and visual disturbance. "   Respiratory:  Positive for cough and chest tightness. Negative for shortness of breath and wheezing.    Cardiovascular:  Negative for chest pain and palpitations.   Gastrointestinal:  Negative for abdominal pain and vomiting.   Genitourinary:  Negative for dysuria and hematuria.   Musculoskeletal:  Negative for arthralgias and back pain.   Skin:  Negative for color change and rash.   Neurological:  Negative for dizziness, seizures, syncope, facial asymmetry, speech difficulty, weakness and headaches.   All other systems reviewed and are negative.        Current Medications       Current Outpatient Medications:     methadone (DOLOPHINE) 10 mg/mL oral concentrated solution, Take 73 mg by mouth daily, Disp: , Rfl:     methylPREDNISolone 4 MG tablet therapy pack, Use as directed on package, Disp: 1 each, Rfl: 0    ondansetron (ZOFRAN-ODT) 4 mg disintegrating tablet, Take 1 tablet (4 mg total) by mouth every 6 (six) hours as needed for nausea (Patient not taking: Reported on 8/30/2023), Disp: 20 tablet, Rfl: 0    phenazopyridine (PYRIDIUM) 200 mg tablet, Take 1 tablet (200 mg total) by mouth 3 (three) times a day (Patient not taking: Reported on 10/7/2023), Disp: 6 tablet, Rfl: 0    tamsulosin (FLOMAX) 0.4 mg, Take 1 capsule (0.4 mg total) by mouth daily with dinner (Patient not taking: Reported on 10/7/2023), Disp: 7 capsule, Rfl: 0    Current Allergies     Allergies as of 12/26/2023    (No Known Allergies)            The following portions of the patient's history were reviewed and updated as appropriate: allergies, current medications, past family history, past medical history, past social history, past surgical history and problem list.     Past Medical History:   Diagnosis Date    Kidney stone        Past Surgical History:   Procedure Laterality Date    TUBAL LIGATION         No family history on file.      Medications have been verified.        Objective   BP (!) 175/90   Pulse (!) 111   Temp 97.8 °F (36.6 °C)    Resp 16   Wt 104 kg (230 lb)   SpO2 98%   BMI 39.48 kg/m²   No LMP recorded.       Physical Exam     Physical Exam  Vitals and nursing note reviewed.   Constitutional:       General: She is not in acute distress.     Appearance: Normal appearance.   HENT:      Head: Normocephalic and atraumatic.      Right Ear: Tympanic membrane and ear canal normal.      Left Ear: Tympanic membrane and ear canal normal.      Nose: Nose normal.      Mouth/Throat:      Mouth: Mucous membranes are moist.      Pharynx: Posterior oropharyngeal erythema present. No oropharyngeal exudate.   Eyes:      Conjunctiva/sclera: Conjunctivae normal.   Cardiovascular:      Rate and Rhythm: Normal rate and regular rhythm.      Pulses: Normal pulses.      Heart sounds: Normal heart sounds.   Pulmonary:      Effort: Pulmonary effort is normal.      Breath sounds: Normal breath sounds. No wheezing, rhonchi or rales.   Musculoskeletal:      Cervical back: Neck supple. Tenderness present.   Lymphadenopathy:      Cervical: Cervical adenopathy present.   Skin:     General: Skin is warm and dry.   Neurological:      Mental Status: She is alert and oriented to person, place, and time.   Psychiatric:         Mood and Affect: Mood normal.         Behavior: Behavior normal.

## 2024-01-03 ENCOUNTER — HOSPITAL ENCOUNTER (EMERGENCY)
Facility: HOSPITAL | Age: 34
Discharge: HOME | End: 2024-01-03
Attending: EMERGENCY MEDICINE | Admitting: EMERGENCY MEDICINE
Payer: OTHER GOVERNMENT

## 2024-01-03 VITALS
RESPIRATION RATE: 18 BRPM | SYSTOLIC BLOOD PRESSURE: 163 MMHG | TEMPERATURE: 98.2 F | DIASTOLIC BLOOD PRESSURE: 94 MMHG | OXYGEN SATURATION: 99 % | HEART RATE: 91 BPM

## 2024-01-03 DIAGNOSIS — B37.0 ORAL THRUSH: Primary | ICD-10-CM

## 2024-01-03 LAB
FLUAV RNA RESP QL NAA+PROBE: NEGATIVE
FLUBV RNA RESP QL NAA+PROBE: NEGATIVE
RSV RNA RESP QL NAA+PROBE: NEGATIVE
S PYO DNA THROAT QL NAA+PROBE: NOT DETECTED
SARS-COV-2 RNA RESP QL NAA+PROBE: NEGATIVE

## 2024-01-03 PROCEDURE — 87651 STREP A DNA AMP PROBE: CPT | Performed by: EMERGENCY MEDICINE

## 2024-01-03 PROCEDURE — 99282 EMERGENCY DEPT VISIT SF MDM: CPT

## 2024-01-03 PROCEDURE — 99284 EMERGENCY DEPT VISIT MOD MDM: CPT | Performed by: EMERGENCY MEDICINE

## 2024-01-03 PROCEDURE — 0241U HB NFCT DS VIR RESP RNA 4 TRGT: CPT | Performed by: EMERGENCY MEDICINE

## 2024-01-03 NOTE — ED PROVIDER NOTES
History  Chief Complaint   Patient presents with    Sore Throat     Pt c/o of sore throat x 2 weeks. Pt wa sput on steroids but states they are not helping. Pt now sttaes she has white spots on her tongue.     33-year-old female with recent antibiotic treatment for UTI followed by viral pharyngitis treated with steroids complains of tenderness in the mouth and throat.  Pain with swallowing and white yeastlike substance on the tongue and inner cheeks.  Similar to prior yeast infection.  Denies fever.  Has a dry cough.  No vomiting, diarrhea or dysuria.  Patient is waiting to see a new PCP.        Prior to Admission Medications   Prescriptions Last Dose Informant Patient Reported? Taking?   methadone (DOLOPHINE) 10 mg/mL oral concentrated solution   Yes No   Sig: Take 73 mg by mouth daily   methylPREDNISolone 4 MG tablet therapy pack   No No   Sig: Use as directed on package   ondansetron (ZOFRAN-ODT) 4 mg disintegrating tablet   No No   Sig: Take 1 tablet (4 mg total) by mouth every 6 (six) hours as needed for nausea   Patient not taking: Reported on 8/30/2023   phenazopyridine (PYRIDIUM) 200 mg tablet   No No   Sig: Take 1 tablet (200 mg total) by mouth 3 (three) times a day   Patient not taking: Reported on 10/7/2023   tamsulosin (FLOMAX) 0.4 mg   No No   Sig: Take 1 capsule (0.4 mg total) by mouth daily with dinner   Patient not taking: Reported on 10/7/2023      Facility-Administered Medications: None       Past Medical History:   Diagnosis Date    Kidney stone        Past Surgical History:   Procedure Laterality Date    TUBAL LIGATION         History reviewed. No pertinent family history.  I have reviewed and agree with the history as documented.    E-Cigarette/Vaping     E-Cigarette/Vaping Substances     Social History     Tobacco Use    Smoking status: Never    Smokeless tobacco: Never   Substance Use Topics    Alcohol use: Never    Drug use: Never       Review of Systems   Constitutional:  Negative for fever.    HENT:  Positive for sore throat. Negative for congestion, dental problem, ear discharge and trouble swallowing.    Respiratory:  Positive for cough. Negative for shortness of breath.    Cardiovascular:  Negative for chest pain.       Physical Exam  Physical Exam  Vitals and nursing note reviewed.   Constitutional:       General: She is not in acute distress.     Appearance: She is well-developed. She is not ill-appearing or diaphoretic.   HENT:      Head: Normocephalic and atraumatic.      Right Ear: No tenderness.      Left Ear: No tenderness.      Nose: No congestion or rhinorrhea.      Mouth/Throat:      Mouth: Mucous membranes are dry. Oral lesions (yeast-like white exudate on tongue.  Easily dislodged. No bleeding, ulceration, edema.) present.      Pharynx: Uvula midline.      Tonsils: No tonsillar abscesses. 0 on the right. 0 on the left.   Eyes:      Conjunctiva/sclera: Conjunctivae normal.      Pupils: Pupils are equal, round, and reactive to light.   Cardiovascular:      Rate and Rhythm: Normal rate and regular rhythm.      Heart sounds: Normal heart sounds. No murmur heard.  Pulmonary:      Effort: Pulmonary effort is normal.      Breath sounds: Normal breath sounds.   Abdominal:      General: Bowel sounds are normal.      Palpations: Abdomen is soft.   Musculoskeletal:         General: Normal range of motion.      Cervical back: Normal range of motion and neck supple.   Lymphadenopathy:      Cervical: No cervical adenopathy.   Skin:     General: Skin is warm and dry.      Capillary Refill: Capillary refill takes less than 2 seconds.      Findings: No rash.   Neurological:      Mental Status: She is alert and oriented to person, place, and time.      Cranial Nerves: No cranial nerve deficit.      Coordination: Coordination normal.      Deep Tendon Reflexes: Reflexes are normal and symmetric.   Psychiatric:         Mood and Affect: Mood normal.         Behavior: Behavior normal.         Vital Signs  ED  Triage Vitals   Temperature Pulse Respirations Blood Pressure SpO2   01/03/24 0617 01/03/24 0617 01/03/24 0617 01/03/24 0618 01/03/24 0617   98.2 °F (36.8 °C) 91 18 163/94 99 %      Temp Source Heart Rate Source Patient Position - Orthostatic VS BP Location FiO2 (%)   01/03/24 0617 01/03/24 0617 -- -- --   Temporal Monitor         Pain Score       01/03/24 0617       4           Vitals:    01/03/24 0617 01/03/24 0618   BP:  163/94   Pulse: 91          Visual Acuity      ED Medications  Medications - No data to display    Diagnostic Studies  Results Reviewed       Procedure Component Value Units Date/Time    COVID19, Influenza A/B, RSV PCR, [facility] [[id number]]  (Normal) Collected: 01/03/24 0622    Lab Status: Final result Specimen: Nares from Nose Updated: 01/03/24 0707     SARS-CoV-2 Negative     INFLUENZA A PCR Negative     INFLUENZA B PCR Negative     RSV PCR Negative    Narrative:      FOR PEDIATRIC PATIENTS - copy/paste COVID Guidelines URL to browser: [email or web address]    SARS-CoV-2 assay is a Nucleic Acid Amplification assay intended for the  qualitative detection of nucleic acid from SARS-CoV-2 in nasopharyngeal  swabs. Results are for the presumptive identification of SARS-CoV-2 RNA.    Positive results are indicative of infection with SARS-CoV-2, the virus  causing COVID-19, but do not rule out bacterial infection or co-infection  with other viruses. Laboratories within the [place] and its  territories are required to report all positive results to the appropriate  public health authorities. Negative results do not preclude SARS-CoV-2  infection and should not be used as the sole basis for treatment or other  patient management decisions. Negative results must be combined with  clinical observations, patient history, and epidemiological information.  This test has not been FDA cleared or approved.    This test has been authorized by FDA under an  Emergency Use Authorization  (EUA). This test is only authorized for the duration of time the  declaration that circumstances exist justifying the authorization of the  emergency use of an in vitro diagnostic tests for detection of SARS-CoV-2  virus and/or diagnosis of COVID-19 infection under section 564(b)(1) of  the Act, 21 U.S.C. 360bbb-3(b)(1), unless the authorization is terminated  or revoked sooner. The test has been validated but independent review by FDA  and CLIA is pending.    Test performed using [employer]: This RT-PCR assay targets N2,  a region unique to SARS-CoV-2. A conserved region in the E-gene was chosen  for pan-Sarbecovirus detection which includes SARS-CoV-2.    According to CMS-2020-01-R, this platform meets the definition of high-throughput technology.    Strep A PCR [[id number]]  (Normal) Collected: 01/03/24 0622    Lab Status: Final result Specimen: Throat Updated: 01/03/24 0655     STREP A PCR Not Detected                   No orders to display              Procedures  Procedures         ED Course                                             Medical Decision Making  34 yo F with oral lesions after recent antibiotic and steroid courses.  Clinically with mild oral candidiasis. No ulcerations, blisters, cellulitis. No tonsillar exudates, dysphonia, stridor or drooling. Remainder of exam unremarkable. Will treat for thrush.    Amount and/or Complexity of Data Reviewed  External Data Reviewed: notes.  Labs: ordered.    Risk  Prescription drug management.             Disposition  Final diagnoses:   Oral thrush     Time reflects when diagnosis was documented in both MDM as applicable and the Disposition within this note       Time User Action Codes Description Comment    1/3/2024  7:37 AM Roshan Dominguez Add [B37.0] Oral thrush           ED Disposition       ED Disposition   Discharge    Condition   Stable    Date/Time   Wed Freeman 3, 2024  7:36 AM    Comment   Adelia Mascorro discharge to  home/self care.                   Follow-up Information       Follow up With Specialties Details Why Contact Info    your PCP  Call in 1 week for follow up, As needed             Discharge Medication List as of 1/3/2024  7:40 AM        START taking these medications    Details   nystatin (MYCOSTATIN) 500,000 units/5 mL suspension Take 5 mL (500,000 Units total) by mouth 4 (four) times a day for 10 days, Starting Wed 1/3/2024, Until Sat 1/13/2024, Normal           CONTINUE these medications which have NOT CHANGED    Details   methadone (DOLOPHINE) 10 mg/mL oral concentrated solution Take 73 mg by mouth daily, Historical Med      methylPREDNISolone 4 MG tablet therapy pack Use as directed on package, Normal      ondansetron (ZOFRAN-ODT) 4 mg disintegrating tablet Take 1 tablet (4 mg total) by mouth every 6 (six) hours as needed for nausea, Starting Wed 2/8/2023, Print      phenazopyridine (PYRIDIUM) 200 mg tablet Take 1 tablet (200 mg total) by mouth 3 (three) times a day, Starting Wed 8/30/2023, Print      tamsulosin (FLOMAX) 0.4 mg Take 1 capsule (0.4 mg total) by mouth daily with dinner, Starting Wed 8/30/2023, Print             No discharge procedures on file.    PDMP Review       None            ED Provider  Electronically Signed by             Roshan Dominguez DO  01/05/24 7312

## 2024-01-03 NOTE — ED NOTES
P t c/o sore throat . Pt was seem at PCP and put on steroids     Lauren Blackburn RN  01/03/24 0716

## 2024-01-03 NOTE — DISCHARGE INSTRUCTIONS
Take the nystatin as directed.  Retain in your mouth as long as possible and swallow each dose.    Avoid steroids and antibiotics.

## 2024-01-08 ENCOUNTER — OFFICE VISIT (OUTPATIENT)
Dept: URGENT CARE | Facility: CLINIC | Age: 34
End: 2024-01-08
Payer: OTHER GOVERNMENT

## 2024-01-08 VITALS
RESPIRATION RATE: 16 BRPM | TEMPERATURE: 98.7 F | OXYGEN SATURATION: 99 % | WEIGHT: 220 LBS | HEIGHT: 64 IN | HEART RATE: 100 BPM | BODY MASS INDEX: 37.56 KG/M2 | DIASTOLIC BLOOD PRESSURE: 88 MMHG | SYSTOLIC BLOOD PRESSURE: 142 MMHG

## 2024-01-08 DIAGNOSIS — R09.81 SINUS CONGESTION: Primary | ICD-10-CM

## 2024-01-08 DIAGNOSIS — B37.0 ORAL THRUSH: ICD-10-CM

## 2024-01-08 PROCEDURE — G0463 HOSPITAL OUTPT CLINIC VISIT: HCPCS | Performed by: PHYSICIAN ASSISTANT

## 2024-01-08 PROCEDURE — 99213 OFFICE O/P EST LOW 20 MIN: CPT | Performed by: PHYSICIAN ASSISTANT

## 2024-01-08 NOTE — PROGRESS NOTES
[facility]        NAME: Adelia Mascorro is a 33 y.o. female  : 1990    MRN: [id number]  DATE: 2024  TIME: 5:58 PM    Assessment and Plan   Sinus congestion [R09.81]  1. Sinus congestion  Ambulatory Referral to Otolaryngology      2. Oral thrush  nystatin (MYCOSTATIN) 500,000 units/5 mL suspension    Ambulatory Referral to Otolaryngology            Patient Instructions       Follow up with PCP in 3-5 days.  Proceed to  ER if symptoms worsen.    Chief Complaint     Chief Complaint   Patient presents with    Thrush     Pt reports being seen in the Emergency department on 2024 and prescribed Nystatin for oral thrush. States been using as prescribed with improvement. Being seen today because she accidentally dumped prescription on floor and was unable to reach the ED for a refill.         History of Present Illness       Patient presents because she spilled her nystatin medication and needs more sent in.  States it has been helping with sore throat.  States she is concerned as the ER said this all started from using a nasal spray contaminated with fungus and she is congested.  States she is concerned he nystatin is not going to help with her sinus symptoms.  Denies facial pain, fevers.      Review of Systems   Review of Systems   Constitutional:  Negative for fever.   HENT:  Positive for congestion and sore throat. Negative for ear discharge, ear pain, mouth sores, postnasal drip, rhinorrhea, sinus pressure, sinus pain and trouble swallowing.          Current Medications       Current Outpatient Medications:     methadone (DOLOPHINE) 10 mg/mL oral concentrated solution, Take 73 mg by mouth daily, Disp: , Rfl:     nystatin (MYCOSTATIN) 500,000 units/5 mL suspension, Take 5 mL (500,000 Units total) by mouth 4 (four) times a day for 10 days, Disp: 200 mL, Rfl: 0    methylPREDNISolone 4 MG tablet therapy pack, Use as directed on package (Patient not taking: Reported on 2024), Disp: 1  "each, Rfl: 0    ondansetron (ZOFRAN-ODT) 4 mg disintegrating tablet, Take 1 tablet (4 mg total) by mouth every 6 (six) hours as needed for nausea (Patient not taking: Reported on 8/30/2023), Disp: 20 tablet, Rfl: 0    phenazopyridine (PYRIDIUM) 200 mg tablet, Take 1 tablet (200 mg total) by mouth 3 (three) times a day (Patient not taking: Reported on 10/7/2023), Disp: 6 tablet, Rfl: 0    tamsulosin (FLOMAX) 0.4 mg, Take 1 capsule (0.4 mg total) by mouth daily with dinner (Patient not taking: Reported on 10/7/2023), Disp: 7 capsule, Rfl: 0    Current Allergies     Allergies as of 01/08/2024    (No Known Allergies)            The following portions of the patient's history were reviewed and updated as appropriate: allergies, current medications, past family history, past medical history, past social history, past surgical history and problem list.     Past Medical History:   Diagnosis Date    Kidney stone        Past Surgical History:   Procedure Laterality Date    TUBAL LIGATION         No family history on file.      Medications have been verified.        Objective   /88 (BP Location: Right arm, Patient Position: Sitting)   Pulse 100   Temp 98.7 °F (37.1 °C)   Resp 16   Ht 5' 4\" (1.626 m)   Wt 99.8 kg (220 lb)   LMP 01/03/2024   SpO2 99%   BMI 37.76 kg/m²   Patient's last menstrual period was 01/03/2024.       Physical Exam     Physical Exam  Constitutional:       Appearance: Normal appearance.   HENT:      Head: Normocephalic and atraumatic.      Right Ear: Tympanic membrane, ear canal and external ear normal.      Left Ear: Tympanic membrane, ear canal and external ear normal.      Nose: Nose normal.      Right Sinus: No maxillary sinus tenderness or frontal sinus tenderness.      Left Sinus: No maxillary sinus tenderness or frontal sinus tenderness.      Mouth/Throat:      Mouth: Mucous membranes are moist.      Pharynx: Oropharynx is clear. No oropharyngeal exudate or posterior oropharyngeal " erythema.   Musculoskeletal:      Cervical back: No muscular tenderness.   Neurological:      Mental Status: She is alert.   Psychiatric:         Mood and Affect: Mood normal.         Behavior: Behavior normal.

## 2024-01-08 NOTE — PATIENT INSTRUCTIONS
Continue using nystatin as prescribed. Follow-up with your primary care provider in the next 3-5 days.  Any new or worsening symptoms develop get re-evaluated sooner or proceed to the ER.  Follow-up with ENT regarding your congestion.